# Patient Record
Sex: MALE | Race: WHITE | NOT HISPANIC OR LATINO | URBAN - METROPOLITAN AREA
[De-identification: names, ages, dates, MRNs, and addresses within clinical notes are randomized per-mention and may not be internally consistent; named-entity substitution may affect disease eponyms.]

---
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
## 2022-12-08 ENCOUNTER — APPOINTMENT (OUTPATIENT)
Dept: GENERAL RADIOLOGY | Facility: HOSPITAL | Age: 22
End: 2022-12-08

## 2022-12-08 ENCOUNTER — HOSPITAL ENCOUNTER (EMERGENCY)
Facility: HOSPITAL | Age: 22
Discharge: HOME | End: 2022-12-09
Attending: EMERGENCY MEDICINE | Admitting: EMERGENCY MEDICINE

## 2022-12-08 DIAGNOSIS — R07.89 CHEST WALL PAIN: Primary | ICD-10-CM

## 2022-12-08 PROCEDURE — 36415 COLL VENOUS BLD VENIPUNCTURE: CPT

## 2022-12-08 PROCEDURE — 99284 EMERGENCY DEPT VISIT MOD MDM: CPT

## 2022-12-08 PROCEDURE — 71046 X-RAY EXAM CHEST 2 VIEWS: CPT

## 2022-12-08 PROCEDURE — 93005 ELECTROCARDIOGRAM TRACING: CPT | Performed by: NURSE PRACTITIONER

## 2022-12-09 VITALS
RESPIRATION RATE: 18 BRPM | OXYGEN SATURATION: 95 % | SYSTOLIC BLOOD PRESSURE: 111 MMHG | TEMPERATURE: 98.4 F | HEIGHT: 66 IN | DIASTOLIC BLOOD PRESSURE: 64 MMHG | HEART RATE: 78 BPM | WEIGHT: 128.53 LBS | BODY MASS INDEX: 20.66 KG/M2

## 2022-12-09 LAB
ALBUMIN SERPL-MCNC: 5.3 G/DL (ref 3.5–5.2)
ALBUMIN/GLOB SERPL: 1.8 G/DL
ALP SERPL-CCNC: 56 U/L (ref 39–117)
ALT SERPL W P-5'-P-CCNC: 23 U/L (ref 1–41)
AMPHET+METHAMPHET UR QL: NEGATIVE
ANION GAP SERPL CALCULATED.3IONS-SCNC: 14 MMOL/L (ref 5–15)
AST SERPL-CCNC: 21 U/L (ref 1–40)
BARBITURATES UR QL SCN: NEGATIVE
BASOPHILS # BLD MANUAL: 0.14 10*3/MM3 (ref 0–0.2)
BASOPHILS NFR BLD MANUAL: 1 % (ref 0–1.5)
BENZODIAZ UR QL SCN: NEGATIVE
BILIRUB SERPL-MCNC: 0.6 MG/DL (ref 0–1.2)
BILIRUB UR QL STRIP: NEGATIVE
BUN SERPL-MCNC: 16 MG/DL (ref 6–20)
BUN/CREAT SERPL: 15 (ref 7–25)
CALCIUM SPEC-SCNC: 9.6 MG/DL (ref 8.6–10.5)
CANNABINOIDS SERPL QL: NEGATIVE
CHLORIDE SERPL-SCNC: 98 MMOL/L (ref 98–107)
CLARITY UR: CLEAR
CO2 SERPL-SCNC: 25 MMOL/L (ref 22–29)
COCAINE UR QL: NEGATIVE
COLOR UR: YELLOW
CREAT SERPL-MCNC: 1.07 MG/DL (ref 0.76–1.27)
DEPRECATED RDW RBC AUTO: 36.3 FL (ref 37–54)
EGFRCR SERPLBLD CKD-EPI 2021: 101.9 ML/MIN/1.73
EOSINOPHIL # BLD MANUAL: 0.14 10*3/MM3 (ref 0–0.4)
EOSINOPHIL NFR BLD MANUAL: 1 % (ref 0.3–6.2)
ERYTHROCYTE [DISTWIDTH] IN BLOOD BY AUTOMATED COUNT: 12.7 % (ref 12.3–15.4)
GLOBULIN UR ELPH-MCNC: 2.9 GM/DL
GLUCOSE SERPL-MCNC: 96 MG/DL (ref 65–99)
GLUCOSE UR STRIP-MCNC: NEGATIVE MG/DL
HCT VFR BLD AUTO: 50.6 % (ref 37.5–51)
HGB BLD-MCNC: 17.3 G/DL (ref 13–17.7)
HGB UR QL STRIP.AUTO: NEGATIVE
HOLD SPECIMEN: NORMAL
HOLD SPECIMEN: NORMAL
KETONES UR QL STRIP: NEGATIVE
LARGE PLATELETS: ABNORMAL
LEUKOCYTE ESTERASE UR QL STRIP.AUTO: NEGATIVE
LIPASE SERPL-CCNC: 23 U/L (ref 13–60)
LYMPHOCYTES # BLD MANUAL: 5.68 10*3/MM3 (ref 0.7–3.1)
LYMPHOCYTES NFR BLD MANUAL: 10 % (ref 5–12)
MCH RBC QN AUTO: 27.8 PG (ref 26.6–33)
MCHC RBC AUTO-ENTMCNC: 34.2 G/DL (ref 31.5–35.7)
MCV RBC AUTO: 81.3 FL (ref 79–97)
METHADONE UR QL SCN: NEGATIVE
MONOCYTES # BLD: 1.42 10*3/MM3 (ref 0.1–0.9)
NEUTROPHILS # BLD AUTO: 6.82 10*3/MM3 (ref 1.7–7)
NEUTROPHILS NFR BLD MANUAL: 47 % (ref 42.7–76)
NEUTS BAND NFR BLD MANUAL: 1 % (ref 0–5)
NITRITE UR QL STRIP: NEGATIVE
OPIATES UR QL: NEGATIVE
OXYCODONE UR QL SCN: NEGATIVE
PH UR STRIP.AUTO: 6.5 [PH] (ref 5–8)
PLATELET # BLD AUTO: 432 10*3/MM3 (ref 140–450)
PMV BLD AUTO: 9.7 FL (ref 6–12)
POTASSIUM SERPL-SCNC: 3.5 MMOL/L (ref 3.5–5.2)
PROT SERPL-MCNC: 8.2 G/DL (ref 6–8.5)
PROT UR QL STRIP: NEGATIVE
RBC # BLD AUTO: 6.22 10*6/MM3 (ref 4.14–5.8)
RBC MORPH BLD: NORMAL
SCAN SLIDE: NORMAL
SODIUM SERPL-SCNC: 137 MMOL/L (ref 136–145)
SP GR UR STRIP: 1.02 (ref 1–1.03)
TROPONIN T SERPL-MCNC: <0.01 NG/ML (ref 0–0.03)
TSH SERPL DL<=0.05 MIU/L-ACNC: 5.33 UIU/ML (ref 0.27–4.2)
UROBILINOGEN UR QL STRIP: NORMAL
VARIANT LYMPHS NFR BLD MANUAL: 40 % (ref 19.6–45.3)
WBC MORPH BLD: NORMAL
WBC NRBC COR # BLD: 14.2 10*3/MM3 (ref 3.4–10.8)
WHOLE BLOOD HOLD COAG: NORMAL
WHOLE BLOOD HOLD SPECIMEN: NORMAL

## 2022-12-09 PROCEDURE — 80307 DRUG TEST PRSMV CHEM ANLYZR: CPT | Performed by: NURSE PRACTITIONER

## 2022-12-09 PROCEDURE — 81003 URINALYSIS AUTO W/O SCOPE: CPT | Performed by: NURSE PRACTITIONER

## 2022-12-09 PROCEDURE — 85007 BL SMEAR W/DIFF WBC COUNT: CPT | Performed by: NURSE PRACTITIONER

## 2022-12-09 PROCEDURE — 83690 ASSAY OF LIPASE: CPT | Performed by: NURSE PRACTITIONER

## 2022-12-09 PROCEDURE — 84484 ASSAY OF TROPONIN QUANT: CPT | Performed by: NURSE PRACTITIONER

## 2022-12-09 PROCEDURE — 80050 GENERAL HEALTH PANEL: CPT | Performed by: NURSE PRACTITIONER

## 2022-12-09 RX ADMIN — LIDOCAINE HYDROCHLORIDE: 20 SOLUTION ORAL; TOPICAL at 00:13

## 2022-12-09 NOTE — DISCHARGE INSTRUCTIONS
Tylenol Motrin as needed.  Follow-up with your family doctor.  Return for any new or worsening symptoms

## 2022-12-09 NOTE — ED PROVIDER NOTES
"Subjective   History of Present Illness  Chief complaint: Multiple complaints      Context: Patient is a 22-year-old male who presents ambulatory by private vehicle with his mother with multiple complaints.  He states over the last month he has had several episodes of sharp or stabbing he chest wall pain that is worse with moving the right arm.  Has had increased amount of stress lately and today's anniversary of his father's death.  Does sit a lot playing video games.  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  Has not been taking any medications at home no alleviate his symptoms    Location: chest  Duration: month  Timing: intermittent  Quality/Severity: sharp  Modifying factors: worse with rom  Associated symptoms: no nausea        Additional hx provided by:  fco    PCP: yesenia             Review of Systems   Constitutional: Negative for fever.   HENT: Negative.    Eyes: Negative for visual disturbance.   Respiratory: Negative.    Cardiovascular: Negative.    Gastrointestinal: Diarrhea: ULIAHPI.   Genitourinary: Negative.    Musculoskeletal: Positive for myalgias.   Skin: Negative for rash.   Allergic/Immunologic: Negative for immunocompromised state.   Neurological: Negative.    Hematological: Does not bruise/bleed easily.   Psychiatric/Behavioral: Negative for confusion.       Past Medical History:   Diagnosis Date   • Abdominal pain, left upper quadrant 05/10/2019   • Acne 01/29/2019   • Acute rhinitis 01/05/2016   • Adenitis 05/14/2018   • Allergic reaction 12/19/2013   • Back pain 05/10/2019   • Cold sore 06/24/2014   • Constipation 08/04/2015   • Contact dermatitis 06/07/2017   • Dermatitis 06/19/2017   • Dysphagia 01/29/2019   • Epigastric pain 11/01/2012   • Fatigue 03/19/2018   • GERD (gastroesophageal reflux disease) " 01/29/2019   • Headache 04/10/2014   • Hereditary spherocytosis (CMS/HCC) 05/11/2018   • Icterus 03/19/2018   • Infection 06/07/2017    SKIN AND SOFT TISSUE   • Ingrown nail 06/03/2014   • Leg pain, left 05/10/2019   • Leg pain, right 05/10/2019   • LOM (left otitis media) 12/05/2011   • 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   • Seborrheic dermatitis 05/14/2018       Allergies   Allergen Reactions   • Bee Venom Swelling   • Latex Hives   • Tetanus-Diphth-Acell Pertussis Rash   • Tetanus Immune Globulin Swelling   • Pyridoxine Rash     B-6       Past Surgical History:   Procedure Laterality Date   • SPLENECTOMY      total       Family History   Problem Relation Age of Onset   • Other Other         SPHEROCYTOSIS   • Lupus Father        Social History     Socioeconomic History   • Marital status: Single   Tobacco Use   • Smoking status: Never   • Smokeless tobacco: Never           Objective   Physical Exam    Vital signs and triage nurse note reviewed.   Constitutional: Awake, alert; well-developed and well-nourished. No acute distress is noted. Nontoxic in appearance.  Mother at bedside.  HEENT: Normocephalic, atraumatic; pupils  with intact EOM; oropharynx is pink and moist without exudate or erythema.   Neck: Supple, full range of motion without pain;    Cardiovascular: Regular rate and rhythm, normal S1-S2.   Pulmonary: Respiratory effort regular nonlabored, breath sounds clear to auscultation all fields.   Abdomen: Soft, nontender nondistended with normoactive bowel sounds; no rebound or guarding.   Musculoskeletal: Independent range of motion of all extremities with no palpable tenderness or edema.   Neuro: Alert oriented x3, speech is clear and appropriate, GCS 15   Skin:  Fleshtone warm, dry, intact; no erythematous or petechial rash or lesion            Procedures      EKG viewed by me and interpreted by Dr. Cordero, sinus rhythm rate of 87 normal  comparison: None              ED Course      Labs Reviewed    COMPREHENSIVE METABOLIC PANEL - Abnormal; Notable for the following components:       Result Value    Albumin 5.30 (*)     All other components within normal limits    Narrative:     GFR Normal >60  Chronic Kidney Disease <60  Kidney Failure <15     TSH - Abnormal; Notable for the following components:    TSH 5.330 (*)     All other components within normal limits   CBC WITH AUTO DIFFERENTIAL - Abnormal; Notable for the following components:    WBC 14.20 (*)     RBC 6.22 (*)     RDW-SD 36.3 (*)     All other components within normal limits   MANUAL DIFFERENTIAL - Abnormal; Notable for the following components:    Lymphocytes Absolute 5.68 (*)     Monocytes Absolute 1.42 (*)     All other components within normal limits   LIPASE - Normal   URINALYSIS W/ MICROSCOPIC IF INDICATED (NO CULTURE) - Normal    Narrative:     Urine microscopic not indicated.   TROPONIN (IN-HOUSE) - Normal    Narrative:     Troponin T Reference Range:  <= 0.03 ng/mL-   Negative for AMI  >0.03 ng/mL-     Abnormal for myocardial necrosis.  Clinicians would have to utilize clinical acumen, EKG, Troponin and serial changes to determine if it is an Acute Myocardial Infarction or myocardial injury due to an underlying chronic condition.       Results may be falsely decreased if patient taking Biotin.     URINE DRUG SCREEN - Normal    Narrative:     Negative Thresholds Per Drugs Screened:    Amphetamines                 500 ng/ml  Barbiturates                 200 ng/ml  Benzodiazepines              100 ng/ml  Cocaine                      300 ng/ml  Methadone                    300 ng/ml  Opiates                      300 ng/ml  Oxycodone                    100 ng/ml  THC                           50 ng/ml    The Normal Value for all drugs tested is negative. This report includes final unconfirmed screening results to be used for medical treatment purposes only. Unconfirmed results must not be used for non-medical purposes such as employment or legal  testing. Clinical consideration should be applied to any drug of abuse test, particularly when unconfirmed results are used.          All urine drugs of abuse requests without chain of custody are for medical screening purposes only.  False positives are possible.     RAINBOW DRAW    Narrative:     The following orders were created for panel order [place] Draw.  Procedure                               Abnormality         Status                     ---------                               -----------         ------                     Green Top (Gel)[[id number]]                                  Final result               Lavender Top[[id number]]                                     Final result               Gold Top - SST[[id number]]                                   Final result               Light Blue Top[[id number]]                                   Final result                 Please view results for these tests on the individual orders.   SCAN SLIDE   CBC AND DIFFERENTIAL    Narrative:     The following orders were created for panel order CBC & Differential.  Procedure                               Abnormality         Status                     ---------                               -----------         ------                     CBC Auto Differential[[id number]]        Abnormal            Final result               Scan Slide[[id number]]                                       Final result                 Please view results for these tests on the individual orders.   GREEN TOP   LAVENDER TOP   GOLD TOP - SST   LIGHT BLUE TOP     Medications   GI cocktail ( Oral Given 12/9/22 0013)     No radiology results for the last day  Prior to Admission medications    Not on File                                          MDM  Number of Diagnoses or Management Options  Chest wall pain  Diagnosis management comments: 7/2021: PCP note: polycythemia; referral to rheum  2/2022: rheumatology note: hereditary spherocytosis s/p  "splenectomy in 2018 with esophageal dilations x 2  wbc 13.8-mother reported long standing issue; normal esr/crp, neg rf/ccp  father  from SLE/sjogren    /81   Pulse 82   Temp 98.3 °F (36.8 °C) (Oral)   Resp 18   Ht 167.6 cm (66\")   Wt 58.3 kg (128 lb 8.5 oz)   SpO2 99%   BMI 20.74 kg/m²       Comorbidities:  has a past medical history of Abdominal pain, left upper quadrant (05/10/2019), Acne (2019), Acute rhinitis (2016), Adenitis (2018), Allergic reaction (2013), Back pain (05/10/2019), Cold sore (2014), Constipation (2015), Contact dermatitis (2017), Dermatitis (2017), Dysphagia (2019), Epigastric pain (2012), Fatigue (2018), GERD (gastroesophageal reflux disease) (2019), Headache (04/10/2014), Hereditary spherocytosis (CMS/HCC) (2018), Icterus (2018), Infection (2017), Ingrown nail (2014), Leg pain, left (05/10/2019), Leg pain, right (05/10/2019), LOM (left otitis media) (2011), Mesenteric lymphadenitis (2014), and Seborrheic dermatitis (2018).  Differentials:   not all inclusive of differentials considered  Radiology interpretation:  X-rays reviewed by me and interpreted by dr mays, neg  Lab interpretation:  Labs viewed by me significant for, white blood cell count 14 which is reportedly normal.  Hemoglobin 7.3, hematocrit 50.6    Appropriate PPE worn during exam.  Patient had labs EKG and chest x-ray obtained  On reexam he is refusing any analgesic medications prescriptions.  We discussed importance of follow-up.  They verbalized understanding.  Low heart score, perc neg    i discussed findings with patient who voices understanding of discharge instructions, signs and symptoms requiring return to ED; discharged improved and in stable condition with follow up for re-evaluation.  This document is intended for medical expert use only. Reading of this document by patients and/or " patient's family without participating medical staff guidance may result in misinterpretation and unintended morbidity.  Any interpretation of such data is the responsibility of the patient and/or family member responsible for the patient in concert with their primary or specialist providers, not to be left for sources of online searches such as [employer], [employer] or similar queries. Relying on these approaches to knowledge may result in misinterpretation, misguided goals of care and even death should patients or family members try recommendations outside of the realm of professional medical care in a supervised inpatient environment.          Amount and/or Complexity of Data Reviewed  Independent visualization of images, tracings, or specimens: yes        Final diagnoses:   Chest wall pain       ED Disposition  ED Disposition     ED Disposition   Discharge    Condition   Stable    Comment   --             Yarely Beckman MD  [address] [facility]   [facility] [address]  [place] [id number] [phone]    Schedule an appointment as soon as possible for a visit            Medication List      No changes were made to your prescriptions during this visit.          Tiffanie Hairston, APRN  12/09/22 0142

## 2022-12-10 LAB — QT INTERVAL: 347 MS

## 2022-12-14 ENCOUNTER — OFFICE VISIT (OUTPATIENT)
Dept: FAMILY MEDICINE CLINIC | Facility: CLINIC | Age: 22
End: 2022-12-14
Payer: COMMERCIAL

## 2022-12-14 ENCOUNTER — HOSPITAL ENCOUNTER (OUTPATIENT)
Dept: GENERAL RADIOLOGY | Facility: HOSPITAL | Age: 22
Discharge: HOME | End: 2022-12-14

## 2022-12-14 VITALS
BODY MASS INDEX: 20.48 KG/M2 | DIASTOLIC BLOOD PRESSURE: 80 MMHG | RESPIRATION RATE: 16 BRPM | WEIGHT: 127.4 LBS | OXYGEN SATURATION: 99 % | SYSTOLIC BLOOD PRESSURE: 119 MMHG | HEIGHT: 66 IN | TEMPERATURE: 97.8 F | HEART RATE: 83 BPM

## 2022-12-14 DIAGNOSIS — M25.50 ARTHRALGIA, UNSPECIFIED JOINT: ICD-10-CM

## 2022-12-14 DIAGNOSIS — M79.7 FIBROMYALGIA: ICD-10-CM

## 2022-12-14 DIAGNOSIS — M79.606 LEG PAIN: ICD-10-CM

## 2022-12-14 DIAGNOSIS — M89.9 LESION OF FEMUR: ICD-10-CM

## 2022-12-14 DIAGNOSIS — M25.50 ARTHRALGIA, UNSPECIFIED JOINT: Primary | ICD-10-CM

## 2022-12-14 PROCEDURE — 99213 OFFICE O/P EST LOW 20 MIN: CPT | Performed by: INTERNAL MEDICINE

## 2022-12-14 PROCEDURE — 73551 X-RAY EXAM OF FEMUR 1: CPT

## 2022-12-14 PROCEDURE — 73552 X-RAY EXAM OF FEMUR 2/>: CPT

## 2022-12-14 PROCEDURE — 71120 X-RAY EXAM BREASTBONE 2/>VWS: CPT

## 2022-12-14 RX ORDER — DULOXETIN HYDROCHLORIDE 30 MG/1
30 CAPSULE, DELAYED RELEASE ORAL DAILY
Qty: 30 CAPSULE | Refills: 3 | Status: SHIPPED | OUTPATIENT
Start: 2022-12-14

## 2022-12-14 NOTE — PROGRESS NOTES
Sternum looks normal- no resolved fracture. Right femur normal- but left with cortical defect- would recheck in 1 year to see if changing, but no need for surgery at this time Encounter Date:04/11/2022      Patient ID: Ren Jacob is a 59 y.o. male.    Chief Complaint:  Status post CABG  Status post stent  Ischemic cardiomyopathy  Status post ICD      History of Present Illness  Patient recently had repositioning of the ICD and pain has improved significantly for to 3 weeks however he started having pain over the superior portion of the ICD and bothering him.  Patient recently had intervention to RCA and patient is not having any angina.    Since I have last seen, the patient has been without any chest discomfort ,shortness of breath, palpitations, dizziness or syncope.  Denies having any headache ,abdominal pain ,nausea, vomiting , diarrhea constipation, loss of weight or loss of appetite.  Denies having any excessive bruising ,hematuria or blood in the stool.    Review of all systems negative except as indicated.    Reviewed ROS.  Assessment and Plan       [[[[[[[[[[[[[[[[[[[[[[[  Impression  =============  -Chest pain-suggestive of possible unstable angina pectoris.  Troponin levels are negative.  EKG showed no acute changes.     -Status post CABG 2004.      -Status post stent placement to right coronary artery in the past.  -Status post stent to circumflex coronary artery and proximal and mid RCA 03/03/2017.  -Status post stent to RCA for in-stent restenosis 3/12/2020  -Status post stent to LAD 5/29/2020  -Status post emergency intervention to totally occluded LAD 6/8/2020 (anterior STEMI)  -Status post stent to RCA November 4, 2021  -Status post stent to RCA 3/29/2022.  (Impella)   IFR to circumflex coronary artery is normal.     -Status post acute anterior STEMI 6/8/2020  Status post emergency intervention for totally occluded left anterior descending artery 6/8/2020 (transient Impella support)  Patient apparently stopped taking Brilinta at the advice of gastroenterologist prior to STEMI presentation.     Cardiac catheterization 3/25/2022 revealed  Left ventricular  enlargement with severe and diffuse hypocontractility with ejection fraction of 20%.  No mitral regurgitation is present.  Left main coronary artery is normal.  Left anterior descending artery is normal.  Circumflex coronary artery proximally has 70 to 80% disease.  Right coronary artery is a large and dominant vessel that has multiple stents.  Mid segment of the right coronary artery has 95% disease.     Cardiac catheterization 11/3/2021   Left ventricle is enlarged with diffuse hypocontractility with ejection fraction of 20%.  No mitral regurgitation is present.  Left main coronary artery is normal.  Left anterior descending artery has mid to distal segment 50 to 60% disease.  Circumflex coronary artery has proximal 50% disease.  Right coronary artery has extensive stents and mid segment has 80% disease.  Patient had previously totally occluded SVG to RCA     Cardiac catheterization 3/12/2021 revealed  Left ventricle is significantly enlarged with diffuse hypocontractility with ejection fraction of 20%.  No mitral regurgitation is present.  Left main coronary artery normal.  Left anterior descending artery has diffuse luminal irregularities without any significant obstructive disease.  Circumflex coronary artery has proximal 50% disease.  Right coronary artery is a large and dominant vessel that has a lengthy area of stent.  Luminal irregularities are present without any significant obstructive disease.  SVG to RCA is chronically occluded.     Cardiac catheterization 11/12/2020 revealed  Left ventricle is significantly enlarged with severe and diffuse hypocontractility with ejection fraction of 20 to 25%.  Left main coronary artery normal.  Left anterior descending artery stent is patent.  Circumflex coronary artery has proximal 50% disease.  Right coronary artery is a dominant vessel that has lengthy stented area and no significant obstructive disease is present.  SVG to RCA totally occluded (chronic)     Repeat  cardiac catheterization 6/11/2020 revealed widely patent LAD stent.  Circumflex coronary artery has proximal 60% disease.  RCA has a lengthy area of stent with distal 60% disease.     -Cardiogenic shock with acute anterior STEMI 6/30/2020- improved     -Right bundle branch block in the presence of acute anterior STEMI.  Better now.     Troponin levels-peak of 12.  Today 10.     Cardiac catheterization 9/9/2020  Left ventricular dysfunction with ejection fraction of 20 to 25% consistent with ischemic cardiomyopathy.  Left main coronary artery is normal.  Left anterior descending artery stent is patent.  Circumflex coronary artery has proximal 60 to 70% disease (patient to have IFR)  Right coronary artery is a dominant vessel that has multiple stents.  Diffuse 40 to 50% luminal irregularities is present.  Please note the proximal stent is sticking into the aorta and makes it difficult to obtain right coronary artery injections.      - Status post dual-chamber ICD ([employer]) 6/15/2020.  Interrogation of the ICD revealed excellent pacing parameters.  Repositioning of the ICD generator (Dr. 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     -Allergy to morphine and penicillin     -Status post appendectomy and knee surgery.   ===========  Plan  ===========      Status post CABG  Status post stent RCA 11/4/2021 last stent)    Ischemic cardiomyopathy-stable.     Patient had stent to RCA 3/29/2022.  (Impella support)  IFR to circumflex coronary artery was normal.     Status post dual-chamber ICD  Recent repositioning of the ICD pulse generator.  Patient was concerned about functioning of the ICD.  Interrogation of the ICD was performed 3/26/2022 and showed normal function.  ICD site looks normal.  However, patient is having pain over the superior portion of the ICD again after 2 weeks of complete improvement.  We will have electrophysiologist  review and discuss about the options.     History of congestive heart failure-compensated at this time.  Patient is considering brain heart modulation therapy through [facility].      Medications were reviewed and updated.     Follow-up in the office in 6 weeks.  Further plan depends on patient's progress.  [[[[[[[[[[[[[[[[[[[[[            Diagnosis Plan   1. Hx of CABG     2. Presence of automatic cardioverter/defibrillator (AICD)     3. Status post angioplasty with stent     4. Ischemic cardiomyopathy     LAB RESULTS (LAST 7 DAYS)    CBC        BMP        CMP         BNP        TROPONIN        CoAg        Creatinine Clearance  Estimated Creatinine Clearance: 115.3 mL/min (by C-G formula based on SCr of 0.88 mg/dL).    ABG        Radiology  No radiology results for the last day                The following portions of the patient's history were reviewed and updated as appropriate: allergies, current medications, past family history, past medical history, past social history, past surgical history and problem list.    Review of Systems   Constitutional: Negative for malaise/fatigue.   Cardiovascular: Negative for chest pain, leg swelling, palpitations and syncope.   Respiratory: Negative for shortness of breath.    Skin: Negative for rash.   Gastrointestinal: Negative for nausea and vomiting.   Neurological: Negative for dizziness, light-headedness and numbness.   All other systems reviewed and are negative.        Current Outpatient Medications:   •  albuterol sulfate  (90 Base) MCG/ACT inhaler, Inhale 2 puffs Every 4 (Four) Hours As Needed for Wheezing., Disp: 8 g, Rfl: 0  •  amitriptyline (ELAVIL) 50 MG tablet, Take 1.5 tablets by mouth Every Night., Disp: 30 tablet, Rfl: 0  •  aspirin 81 MG EC tablet, Take 1 tablet by mouth Daily., Disp: 30 tablet, Rfl: 0  •  atorvastatin (LIPITOR) 80 MG tablet, Take 1 tablet by mouth every night at bedtime., Disp: 30 tablet, Rfl: 0  •  bisacodyl (DULCOLAX)  5 MG EC tablet, Take 5 mg by mouth Daily As Needed for Constipation., Disp: , Rfl:   •  busPIRone (BUSPAR) 10 MG tablet, Take 2 tablets by mouth 2 (Two) Times a Day., Disp: 60 tablet, Rfl: 0  •  busPIRone (BUSPAR) 10 MG tablet, Take 1 tablet by mouth Daily. noon, Disp: 30 tablet, Rfl: 0  •  clonazePAM (KlonoPIN) 0.5 MG tablet, Take 1 tablet by mouth At Night As Needed for Anxiety., Disp: 5 tablet, Rfl: 0  •  colestipol (COLESTID) 1 g tablet, Take 2 tablets by mouth 2 (Two) Times a Day., Disp: 30 tablet, Rfl: 0  •  Diclofenac Sodium (VOLTAREN) 1 % gel gel, Apply 4 g topically to the appropriate area as directed 2 (Two) Times a Day., Disp: , Rfl:   •  docusate sodium (COLACE) 100 MG capsule, Take 100 mg by mouth 2 (Two) Times a Day As Needed for Constipation., Disp: , Rfl:   •  escitalopram (LEXAPRO) 20 MG tablet, Take 1 tablet by mouth Daily., Disp: 30 tablet, Rfl: 0  •  fluticasone-salmeterol (ADVAIR) 250-50 MCG/DOSE DISKUS, Inhale 1 puff 2 (Two) Times a Day., Disp: 60 each, Rfl: 0  •  furosemide (LASIX) 80 MG tablet, Take 1 tablet by mouth 3 (Three) Times a Day., Disp: 90 tablet, Rfl: 0  •  gabapentin (NEURONTIN) 600 MG tablet, Take 2 tablets by mouth 3 (Three) Times a Day., Disp: 30 tablet, Rfl: 0  •  Galcanezumab-gnlm 100 MG/ML solution prefilled syringe, Inject 300 mg under the skin into the appropriate area as directed Every 30 (Thirty) Days. At onset of cluster period and then once monthly until end of cluster period, Disp: , Rfl:   •  ipratropium-albuterol (DUO-NEB) 0.5-2.5 mg/3 ml nebulizer, Take 3 mL by nebulization Every 4 (Four) Hours As Needed for Wheezing., Disp: 360 mL, Rfl: 0  •  isosorbide mononitrate (IMDUR) 30 MG 24 hr tablet, Take 1 tablet by mouth Daily for 30 days., Disp: 30 tablet, Rfl: 0  •  lisinopril (PRINIVIL,ZESTRIL) 10 MG tablet, Take 0.5 tablets by mouth Daily., Disp: 30 tablet, Rfl: 0  •  Melatonin 3 MG capsule, Take 1 capsule by mouth every night at bedtime., Disp: 10 capsule, Rfl:  0  •  metoprolol tartrate (LOPRESSOR) 25 MG tablet, Take 0.5 tablets by mouth 2 (Two) Times a Day., Disp: 30 tablet, Rfl: 0  •  Multiple Vitamins-Minerals (MULTIVITAMIN ADULTS) tablet, Take 1 tablet by mouth Daily., Disp: , Rfl:   •  nitroglycerin (NITROSTAT) 0.4 MG SL tablet, Place 1 tablet under the tongue Every 5 (Five) Minutes As Needed for Chest Pain (Only if SBP Greater Than 100). Take no more than 3 doses in 15 minutes., Disp: 30 tablet, Rfl: 0  •  pantoprazole (PROTONIX) 40 MG EC tablet, Take 1 tablet by mouth 2 (Two) Times a Day., Disp: 60 tablet, Rfl: 0  •  QUEtiapine (SEROquel) 300 MG tablet, Take 1 tablet by mouth Every Night., Disp: 30 tablet, Rfl: 0  •  ranolazine (RANEXA) 1000 MG 12 hr tablet, Take 1 tablet by mouth Every 12 (Twelve) Hours., Disp: 60 tablet, Rfl: 0  •  ticagrelor (Brilinta) 90 MG tablet tablet, Take 1 tablet by mouth 2 (Two) Times a Day. Pt is seeing Dr. Rangel tomorrow and will mention to Brilinta to see if he should stop it-- Dr. Cervantes told him to not stop it and he thinks Dr. rangel is aware, but he is going to ask tomorrow, Disp: 60 tablet, Rfl: 0  •  tiotropium bromide monohydrate (Spiriva Respimat) 2.5 MCG/ACT aerosol solution inhaler, Inhale 2 puffs Daily., Disp: 1 each, Rfl: 0    Allergies   Allergen Reactions   • Ketorolac Tromethamine Other (See Comments)   • Ondansetron Nausea And Vomiting   • Penicillins Swelling     throat   • Morphine Rash       Family History   Problem Relation Age of Onset   • Cancer Mother    • Heart disease Father    • Heart disease Sister        Past Surgical History:   Procedure Laterality Date   • APPENDECTOMY     • BIVENTRICULAR ASSIST DEVICE/LEFT VENTRICULAR ASSIST DEVICE INSERTION N/A 6/8/2020    Procedure: Left Ventricular Assist Device;  Surgeon: John Marino MD;  Location: [facility] INVASIVE LOCATION;  Service: Cardiology;  Laterality: N/A;   • BRONCHOSCOPY N/A 11/3/2021    Procedure: BRONCHOSCOPY;  Surgeon: Martir Stover MD;   Location: [facility] ENDOSCOPY;  Service: Pulmonary;  Laterality: N/A;  post: bronchitis, no blood noted in lung fields   • CARDIAC CATHETERIZATION N/A 3/12/2020    Procedure: Left Heart Cath and coronary angiogram;  Surgeon: Halie Cervantes MD;  Location: [facility] CATH INVASIVE LOCATION;  Service: Cardiovascular;  Laterality: N/A;   • CARDIAC CATHETERIZATION N/A 3/12/2020    Procedure: Left ventriculography;  Surgeon: Halie Cervantes MD;  Location: [facility] CATH INVASIVE LOCATION;  Service: Cardiovascular;  Laterality: N/A;   • CARDIAC CATHETERIZATION N/A 3/12/2020    Procedure: Stent LAURA coronary;  Surgeon: Ritchie Gaines MD;  Location: [facility] CATH INVASIVE LOCATION;  Service: Cardiovascular;  Laterality: N/A;   • CARDIAC CATHETERIZATION N/A 3/12/2020    Procedure: Left Heart Cath, possible pci;  Surgeon: Ritchie Gaines MD;  Location: [facility] CATH INVASIVE LOCATION;  Service: Cardiovascular;  Laterality: N/A;   • CARDIAC CATHETERIZATION N/A 6/8/2020    Procedure: Left Heart Cath;  Surgeon: John Marino MD;  Location: [facility] CATH INVASIVE LOCATION;  Service: Cardiology;  Laterality: N/A;   • CARDIAC CATHETERIZATION N/A 6/8/2020    Procedure: Stent LAURA coronary;  Surgeon: John Marino MD;  Location: [facility] CATH INVASIVE LOCATION;  Service: Cardiology;  Laterality: N/A;   • CARDIAC CATHETERIZATION N/A 6/8/2020    Procedure: Right Heart Cath;  Surgeon: John Marino MD;  Location: [facility] CATH INVASIVE LOCATION;  Service: Cardiology;  Laterality: N/A;   • CARDIAC CATHETERIZATION N/A 6/11/2020    Procedure: Left Heart Cath and coronary angiogram;  Surgeon: Halie Cervantes MD;  Location: [facility] CATH INVASIVE LOCATION;  Service: Cardiovascular;  Laterality: N/A;   • CARDIAC CATHETERIZATION N/A 6/15/2020    Procedure: Thoracic venogram;  Surgeon: Halie Cervantes MD;  Location: [facility] CATH INVASIVE LOCATION;  Service: Cardiovascular;  Laterality: N/A;   • CARDIAC  CATHETERIZATION Left 5/29/2020    Procedure: Left Heart Cath and coronary angiogram;  Surgeon: Halie Cervantes MD;  Location:  [facility] CATH INVASIVE LOCATION;  Service: Cardiovascular;  Laterality: Left;   • CARDIAC CATHETERIZATION N/A 5/29/2020    Procedure: Saphenous Vein Graft;  Surgeon: Halie Cervantes MD;  Location:  [facility] CATH INVASIVE LOCATION;  Service: Cardiovascular;  Laterality: N/A;   • CARDIAC CATHETERIZATION N/A 5/29/2020    Procedure: Left ventriculography;  Surgeon: Halie Cervantes MD;  Location:  [facility] CATH INVASIVE LOCATION;  Service: Cardiovascular;  Laterality: N/A;   • CARDIAC CATHETERIZATION  5/29/2020    Procedure: Functional Flow [place];  Surgeon: Lizz Boston MD;  Location: [facility] CATH INVASIVE LOCATION;  Service: Cardiovascular;;   • CARDIAC CATHETERIZATION N/A 5/29/2020    Procedure: Stent LAURA coronary;  Surgeon: Lizz Boston MD;  Location: [facility] CATH INVASIVE LOCATION;  Service: Cardiovascular;  Laterality: N/A;   • CARDIAC CATHETERIZATION Right 9/9/2020    Procedure: Left Heart Cath and coronary angiogram;  Surgeon: Halie Cervantes MD;  Location: [facility] CATH INVASIVE LOCATION;  Service: Cardiovascular;  Laterality: Right;   • CARDIAC CATHETERIZATION N/A 9/9/2020    Procedure: Saphenous Vein Graft;  Surgeon: Halie Cervantes MD;  Location: [facility] CATH INVASIVE LOCATION;  Service: Cardiovascular;  Laterality: N/A;   • CARDIAC CATHETERIZATION  9/9/2020    Procedure: Functional Flow [place];  Surgeon: Ritchie Gaines MD;  Location: [facility] CATH INVASIVE LOCATION;  Service: Cardiology;;   • CARDIAC CATHETERIZATION N/A 11/12/2020    Procedure: Left Heart Cath and coronary angiogram;  Surgeon: Halie Cervantes MD;  Location: [facility] CATH INVASIVE LOCATION;  Service: Cardiovascular;  Laterality: N/A;   • CARDIAC CATHETERIZATION N/A 11/12/2020    Procedure: Saphenous Vein Graft;  Surgeon: Halie Cervantes MD;  Location: [facility] CATH INVASIVE LOCATION;   Service: Cardiovascular;  Laterality: N/A;   • CARDIAC CATHETERIZATION N/A 11/12/2020    Procedure: Left ventriculography;  Surgeon: Halie Cervantes MD;  Location:  [facility] CATH INVASIVE LOCATION;  Service: Cardiovascular;  Laterality: N/A;   • CARDIAC CATHETERIZATION N/A 3/12/2021    Procedure: Left Heart Cath and coronary angiogram;  Surgeon: Halie Cervantes MD;  Location:  [facility] CATH INVASIVE LOCATION;  Service: Cardiovascular;  Laterality: N/A;   • CARDIAC CATHETERIZATION N/A 3/12/2021    Procedure: Saphenous Vein Graft;  Surgeon: Halie Cervantes MD;  Location:  [facility] CATH INVASIVE LOCATION;  Service: Cardiovascular;  Laterality: N/A;   • CARDIAC CATHETERIZATION N/A 11/3/2021    Procedure: Left Heart Cath and coronary angiogram;  Surgeon: Halie Cervantes MD;  Location:  [facility] CATH INVASIVE LOCATION;  Service: Cardiovascular;  Laterality: N/A;   • CARDIAC CATHETERIZATION N/A 11/4/2021    Procedure: Percutaneous Coronary Intervention, laser;  Surgeon: Ritchie Gaines MD;  Location:  [facility] CATH INVASIVE LOCATION;  Service: Cardiovascular;  Laterality: N/A;   • CARDIAC CATHETERIZATION N/A 11/4/2021    Procedure: Stent LAURA coronary;  Surgeon: Ritchie Gaines MD;  Location:  [facility] CATH INVASIVE LOCATION;  Service: Cardiovascular;  Laterality: N/A;   • CARDIAC CATHETERIZATION N/A 3/28/2022    Procedure: Percutaneous Coronary Intervention;  Surgeon: Ritchie Gaines MD;  Location:  [facility] CATH INVASIVE LOCATION;  Service: Cardiovascular;  Laterality: N/A;  Impella and laser   • CARDIAC CATHETERIZATION N/A 3/25/2022    Procedure: Left Heart Cath and coronary angiogram;  Surgeon: Halie Cervantes MD;  Location:  [facility] CATH INVASIVE LOCATION;  Service: Cardiovascular;  Laterality: N/A;   • CARDIAC ELECTROPHYSIOLOGY PROCEDURE N/A 6/15/2020    Procedure: IMPLANTABLE CARDIOVERTER DEFIBRILLATOR INSERTION-DC;  Surgeon: Halie Cervantes MD;  Location:  [facility] CATH INVASIVE LOCATION;  Service:  Cardiovascular;  Laterality: N/A;   • CARDIAC ELECTROPHYSIOLOGY PROCEDURE N/A 6/15/2020    Procedure: EP/CRM Study;  Surgeon: Brina Douglas MD;  Location: [facility] CATH INVASIVE LOCATION;  Service: Cardiology;  Laterality: N/A;   • CARDIAC ELECTROPHYSIOLOGY PROCEDURE N/A 3/1/2022    Procedure: ICD can repositioning [place] aware;  Surgeon: Sarah Milligan MD;  Location: [facility] CATH INVASIVE LOCATION;  Service: Cardiology;  Laterality: N/A;   • CORONARY ANGIOPLASTY      2 stents, last one placed 2018   • CORONARY ARTERY BYPASS GRAFT  2004   • INGUINAL HERNIA REPAIR Bilateral 10/29/2019    Procedure: BILATERAL INGUINAL HERNIA REPAIRS W/MESH;  Surgeon: Adriana Baker MD;  Location: [facility] MAIN OR;  Service: General   • INSERT / REPLACE / REMOVE PACEMAKER     • JOINT REPLACEMENT Left    • KNEE ARTHROPLASTY Left     x 5   • NISSEN FUNDOPLICATION LAPAROSCOPIC      x 2   • PACEMAKER IMPLANTATION     • SKIN CANCER EXCISION         Past Medical History:   Diagnosis Date   • Anxiety    • Asthma    • Bruises easily    • CHF (congestive heart failure) ([facility])    • Chronic respiratory failure with hypoxia ([facility]) 06/12/2020   • Constipation    • COPD (chronic obstructive pulmonary disease) ([facility])    • Coronary artery disease     Dr. Cervantes   • Depression    • Dysphagia 09/2020   • Dyspnea    • GERD (gastroesophageal reflux disease)    • Hyperlipidemia    • Hypertension    • Lesion of lung 06/2020    following up with dr. william   • Old myocardial infarction 2011    and 2 in June, 2020   • Pancreatitis    • Panic attack    • Simple chronic bronchitis ([facility]) 05/28/2020    Added automatically from request for surgery [id number]   • Sleep apnea     O2 QHS   • Stomach ulcer 2019       Family History   Problem Relation Age of Onset   • Cancer Mother    • Heart disease Father    • Heart disease Sister        Social History     Socioeconomic History   • Marital status:    Tobacco Use   • Smoking status: Former Smoker     Types:  "Cigarettes     Quit date:      Years since quittin.2   • Smokeless tobacco: Never Used   Vaping Use   • Vaping Use: Never used   Substance and Sexual Activity   • Alcohol use: Yes     Comment: 1 glass/2 months   • Sexual activity: Defer         Procedures      Objective:       Physical Exam    /79   Pulse 66   Ht 180.3 cm (71\")   Wt 88 kg (194 lb)   SpO2 99%   BMI 27.06 kg/m²   The patient is alert, oriented and in no distress.    Vital signs as noted above.    Head and neck revealed no carotid bruits or jugular venous distension.  No thyromegaly or lymphadenopathy is present.    Lungs clear.  No wheezing.  Breath sounds are normal bilaterally.    Heart normal first and second heart sounds.  No murmur..  No pericardial rub is present.  No gallop is present.    Abdomen soft and nontender.  No organomegaly is present.    Extremities revealed good peripheral pulses without any pedal edema.    Skin warm and dry.  ICD site looks normal although there is tenderness over the superior part of the ICD    Musculoskeletal system is grossly normal.    CNS grossly normal.    Reviewed and updated.        "

## 2022-12-14 NOTE — PROGRESS NOTES
Rooming Tab(CC,VS,Pt Hx,Fall Screen)  Chief Complaint   Patient presents with   • Hospital Follow Up Visit     Patient went to [facility] ER on 12/8/22 for chest wall pain but was not admitted.       Subjective      The patient is accompanied by an adult female.    Chest pain  The patient reports that he went to the emergency room because his chest felt like he was being stabbed. He states that he has been experiencing this sporadically for approximately 1 month. When the pain occurs, it lasts for the rest of the day. He notes that the pain is only on the right side. He states that the pain is reproducible. He denies getting sweaty or short of breath with it. The adult female notes that positioning would sometimes make the pain slightly better. 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 The patient denies ever waking up to the pain. He reports that he eats 1 meal a day at 6:00 PM to 7:00 PM. In the emergency room, blood work, EKG and a chest radiograph were performed. They ruled out any heart, lung or gallbladder issues. This pain occurs mostly at work. It has only happened once or twice when he was not at work. The adult female states that the rheumatologist thought he had a crack or fracture in the sternum. The patient reports that he moves his mouse a lot during the day, but that is more wrist than anything. He reports that he makes sure everything is ergonomically correct. He reports that he had a radiograph performed at [facility] on 02/16/2022. He states that he was also told that he may have fibromyalgia.    Right thigh mass  The patient reports that he was told that there was a mass in his right thigh.     I have reviewed and updated his medications, medical history and problem list during today's office visit.     Patient Care Team:  Yarely Beckman MD as PCP - General (Internal Medicine)    Problem  List Tab  Medications Tab  Synopsis Tab  Chart Review Tab  Care Everywhere Tab  Immunizations Tab  Patient History Tab    Social History     Tobacco Use   • Smoking status: Never   • Smokeless tobacco: Never   Substance Use Topics   • Alcohol use: Not on file       Review of Systems    Objective     Rooming Tab(CC,VS,Pt Hx,Fall Screen)  /80   Pulse 83   Temp 97.8 °F (36.6 °C)   Resp 16   Ht 167.6 cm (66\")   Wt 57.8 kg (127 lb 6.4 oz)   SpO2 99%   BMI 20.56 kg/m²     Body mass index is 20.56 kg/m².    Physical Exam  Vitals and nursing note reviewed.   Constitutional:       Appearance: Normal appearance. He is well-developed.   HENT:      Head: Normocephalic and atraumatic.      Nose: No rhinorrhea.   Eyes:      Pupils: Pupils are equal, round, and reactive to light.   Cardiovascular:      Rate and Rhythm: Normal rate and regular rhythm.      Pulses: Normal pulses.      Heart sounds: Normal heart sounds. No murmur heard.     Comments: Tender palpate sternum  Pulmonary:      Effort: Pulmonary effort is normal.      Breath sounds: Normal breath sounds.   Musculoskeletal:         General: No tenderness.      Cervical back: Normal range of motion and neck supple.   Skin:     Capillary Refill: Capillary refill takes less than 2 seconds.   Neurological:      Mental Status: He is alert and oriented to person, place, and time.   Psychiatric:         Mood and Affect: Mood normal.         Behavior: Behavior normal.          Statin Choice Calculator  Data Reviewed:    XR Chest 2 View    Result Date: 12/9/2022  Impression: No active cardiopulmonary disease  Electronically Signed By-Dwight Mishra On:12/9/2022 7:24 AM This report was finalized on [id number] by  Dwight Mishra, .    XR Sternum PA & Lateral    Result Date: 12/14/2022  Impression: No evidence for displaced fracture or malalignment of the sternum.  Electronically Signed By-Flavio Nagy MD On:12/14/2022 3:13 PM This report was finalized on  [id number] by  Flavio Nagy MD.    XR Femur 2 View Right (In Office)    Result Date: 12/14/2022  Impression: 1.     Negative radiographs of the right femur. If there is continued concern for a mass associated with the right thigh, correlation with MRI of the right thigh would be helpful for better characterization of the soft tissues.  Electronically Signed By-Flavio Nagy MD On:12/14/2022 3:41 PM This report was finalized on [id number] by  Flavio Nagy MD.    XR Femur 1 View Left    Result Date: 12/14/2022  Impression: IMPRESSION : Findings most consistent with a fibrous cortical defect at the medial aspect of the distal diaphysis of the left femur. Follow-up may be obtained to ensure stability as indicated.  Electronically Signed By-Flavio Nagy MD On:12/14/2022 3:43 PM This report was finalized on [id number] by  Flavio Nagy MD.      The data below has been reviewed by Yarely Beckman MD on 12/14/2022.      Lab Results   Component Value Date    BUN 16 12/09/2022    CREATININE 1.07 12/09/2022     12/09/2022    K 3.5 12/09/2022    CL 98 12/09/2022    CALCIUM 9.6 12/09/2022    ALBUMIN 5.30 (H) 12/09/2022    BILITOT 0.6 12/09/2022    ALKPHOS 56 12/09/2022    AST 21 12/09/2022    ALT 23 12/09/2022    WBC 14.20 (H) 12/09/2022    RBC 6.22 (H) 12/09/2022    HCT 50.6 12/09/2022    MCV 81.3 12/09/2022    MCH 27.8 12/09/2022    TSH 5.330 (H) 12/09/2022      Assessment & Plan   Order Review Tab  Health Maintenance Tab  Patient Plan/Order Tab  Diagnoses and all orders for this visit:    1. Arthralgia, unspecified joint (Primary)  -     XR Sternum PA & Lateral; Future  -     Cancel: XR Femur 2 View Right (In Office)  -     XR Femur 2 View Right (In Office)    2. Lesion of femur  -     XR Sternum PA & Lateral; Future  -     Cancel: XR Femur 2 View Right (In Office)  -     XR Femur 2 View Right (In Office)    3. Fibromyalgia    Other orders  -     DULoxetine (Cymbalta) 30 MG capsule; Take 1  capsule by mouth Daily.  Dispense: 30 capsule; Refill: 3    1. Chest pain  We will obtain a radiograph of the chest.    2. Fibromyalgia  We will start the patient on Cymbalta 30 mg daily. If he does not feel any difference in 6 weeks, we can go to 60 mg daily.  We discussed the importance of staying active, healthy, and eating plenty of fluids.  .   3. Right thigh mass  We will obtain a radiograph of the right thigh.      Wrapup Tab  Return if symptoms worsen or fail to improve.       They were informed of the diagnosis and treatment plan and directed to f/u for any further problems or concerns.      Transcribed from ambient dictation for Yarely Beckman MD by Cori Mcneil.  12/14/22   15:21 EST    Patient or patient representative verbalized consent to the visit recording.  I have personally performed the services described in this document as transcribed by the above individual, and it is both accurate and complete.  Yarely Beckman MD  1/1/2023  22:02 EST

## 2023-04-11 ENCOUNTER — LAB (OUTPATIENT)
Dept: FAMILY MEDICINE CLINIC | Facility: CLINIC | Age: 23
End: 2023-04-11
Payer: COMMERCIAL

## 2023-04-11 ENCOUNTER — OFFICE VISIT (OUTPATIENT)
Dept: FAMILY MEDICINE CLINIC | Facility: CLINIC | Age: 23
End: 2023-04-11
Payer: COMMERCIAL

## 2023-04-11 VITALS
SYSTOLIC BLOOD PRESSURE: 96 MMHG | HEIGHT: 66 IN | OXYGEN SATURATION: 98 % | HEART RATE: 65 BPM | BODY MASS INDEX: 20.57 KG/M2 | WEIGHT: 128 LBS | DIASTOLIC BLOOD PRESSURE: 64 MMHG

## 2023-04-11 DIAGNOSIS — M79.7 FIBROMYALGIA: ICD-10-CM

## 2023-04-11 DIAGNOSIS — D58.0 HEREDITARY SPHEROCYTOSIS: ICD-10-CM

## 2023-04-11 DIAGNOSIS — M25.50 ARTHRALGIA, UNSPECIFIED JOINT: Primary | ICD-10-CM

## 2023-04-11 LAB
ALBUMIN SERPL-MCNC: 5.2 G/DL (ref 3.5–5.2)
ALBUMIN/GLOB SERPL: 2.3 G/DL
ALP SERPL-CCNC: 53 U/L (ref 39–117)
ALT SERPL W P-5'-P-CCNC: 21 U/L (ref 1–41)
ANION GAP SERPL CALCULATED.3IONS-SCNC: 11.2 MMOL/L (ref 5–15)
AST SERPL-CCNC: 14 U/L (ref 1–40)
BASOPHILS # BLD AUTO: 0.1 10*3/MM3 (ref 0–0.2)
BASOPHILS NFR BLD AUTO: 0.9 % (ref 0–1.5)
BILIRUB CONJ SERPL-MCNC: 0.2 MG/DL (ref 0–0.3)
BILIRUB SERPL-MCNC: 0.6 MG/DL (ref 0–1.2)
BUN SERPL-MCNC: 17 MG/DL (ref 6–20)
BUN/CREAT SERPL: 13.8 (ref 7–25)
CALCIUM SPEC-SCNC: 9.7 MG/DL (ref 8.6–10.5)
CHLORIDE SERPL-SCNC: 101 MMOL/L (ref 98–107)
CO2 SERPL-SCNC: 26.8 MMOL/L (ref 22–29)
CREAT SERPL-MCNC: 1.23 MG/DL (ref 0.76–1.27)
DEPRECATED RDW RBC AUTO: 36.1 FL (ref 37–54)
EGFRCR SERPLBLD CKD-EPI 2021: 86.2 ML/MIN/1.73
EOSINOPHIL # BLD AUTO: 0.35 10*3/MM3 (ref 0–0.4)
EOSINOPHIL NFR BLD AUTO: 3.3 % (ref 0.3–6.2)
ERYTHROCYTE [DISTWIDTH] IN BLOOD BY AUTOMATED COUNT: 12.3 % (ref 12.3–15.4)
GLOBULIN UR ELPH-MCNC: 2.3 GM/DL
GLUCOSE SERPL-MCNC: 85 MG/DL (ref 65–99)
HCT VFR BLD AUTO: 49.1 % (ref 37.5–51)
HGB BLD-MCNC: 17.1 G/DL (ref 13–17.7)
IMM GRANULOCYTES # BLD AUTO: 0.02 10*3/MM3 (ref 0–0.05)
IMM GRANULOCYTES NFR BLD AUTO: 0.2 % (ref 0–0.5)
LYMPHOCYTES # BLD AUTO: 4.07 10*3/MM3 (ref 0.7–3.1)
LYMPHOCYTES NFR BLD AUTO: 38.1 % (ref 19.6–45.3)
MCH RBC QN AUTO: 28.5 PG (ref 26.6–33)
MCHC RBC AUTO-ENTMCNC: 34.8 G/DL (ref 31.5–35.7)
MCV RBC AUTO: 81.7 FL (ref 79–97)
MONOCYTES # BLD AUTO: 1.15 10*3/MM3 (ref 0.1–0.9)
MONOCYTES NFR BLD AUTO: 10.8 % (ref 5–12)
NEUTROPHILS NFR BLD AUTO: 4.98 10*3/MM3 (ref 1.7–7)
NEUTROPHILS NFR BLD AUTO: 46.7 % (ref 42.7–76)
NRBC BLD AUTO-RTO: 0 /100 WBC (ref 0–0.2)
PLATELET # BLD AUTO: 388 10*3/MM3 (ref 140–450)
PMV BLD AUTO: 12.9 FL (ref 6–12)
POTASSIUM SERPL-SCNC: 4.2 MMOL/L (ref 3.5–5.2)
PROT SERPL-MCNC: 7.5 G/DL (ref 6–8.5)
RBC # BLD AUTO: 6.01 10*6/MM3 (ref 4.14–5.8)
SODIUM SERPL-SCNC: 139 MMOL/L (ref 136–145)
WBC NRBC COR # BLD: 10.67 10*3/MM3 (ref 3.4–10.8)

## 2023-04-11 PROCEDURE — 85025 COMPLETE CBC W/AUTO DIFF WBC: CPT | Performed by: INTERNAL MEDICINE

## 2023-04-11 PROCEDURE — 82248 BILIRUBIN DIRECT: CPT | Performed by: INTERNAL MEDICINE

## 2023-04-11 PROCEDURE — 36415 COLL VENOUS BLD VENIPUNCTURE: CPT | Performed by: INTERNAL MEDICINE

## 2023-04-11 PROCEDURE — 80053 COMPREHEN METABOLIC PANEL: CPT | Performed by: INTERNAL MEDICINE

## 2023-04-11 RX ORDER — AMITRIPTYLINE HYDROCHLORIDE 25 MG/1
25 TABLET, FILM COATED ORAL NIGHTLY
Qty: 30 TABLET | Refills: 3 | Status: SHIPPED | OUTPATIENT
Start: 2023-04-11

## 2023-04-11 NOTE — PROGRESS NOTES
"Rooming Tab(CC,VS,Pt Hx,Fall Screen)  Chief Complaint   Patient presents with   • Abdominal Pain   • Generalized Body Aches     Has been going on for years but has gotten worse in the last couple months       Subjective      The patient is accompanied by his mother.    Chronic pain  The patient notes that his chest is still bothering him the most. 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 The patient experiences more knee pain when he is sitting. When the patient is home, he stands up at least once every hour to grab a snack or go to the bathroom; however, when he is at work, he has to stand up to take videos, as he is a social . The patient is not taking glucosamine or turmeric.    Mood  The patient states that the duloxetine helps his mood significantly; however, he experiences fear. Fear is one of the symptoms he read on the duloxetine bottle. The patient becomes scared of absolutely nothing all the time. The patient was terrified, so he stopped taking the duloxetine and the fear disappeared. The adult female mentions that the patient carries a gun with him, as the patient goes to different places for work. The patient notes that he absolutely does not want to be afraid of anything. The duloxetine helped with his mood swings. The patient has just been taking the duloxetine at the low dose of 30 mg. The patient notes that he is not sleeping well. The patient states that he is already tired every morning. The adult female notes that the patient does not usually smile until 10:00 AM or 11:00 AM.     Abdominal pain with jaundice  The adult female reports that they were on a cruise, and they just returned yesterday, 04/10/2023. Someone on the cruise had COVID-19. The adult female notes that no one else has any symptoms of COVID-19. When the patient was on the cruise, he felt an intense pain \"internally\" and not necessarily in the joints. The " patient experienced pain in his upper abdomen. On the last day of the cruise, everybody was concerned about the patient, as he looked awful. The adult female mentions that the patient's eyes were yellow. 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 One of the people on the cruise who saw the patient's condition actually told the patient to consult a clinician. The patient states that his upper abdomen is still currently painful. 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 The patient states that he is not eating differently. The adult female reports that the patient ate salmon and steak. The patient denies any constipation or diarrhea. The patient denies any change in bowels. The patient denies any blood in his stool. The patient had a HIDA scan and an ultrasound looking for gallbladder in 2018. In 2023, he just had a chest x-ray. The adult female inquires if the CAT scan will show the splenules. The patient inquires if the splenules can be the only reason for the patient's eyes being yellow in color. The patient did not take any medications for the abdominal pain during the cruise. The patient took a picture of his eyes yellowing; however, he deleted it right away. The patient has been working 40 hours every week, dancing 4 to 5 days weekly.      I have reviewed and updated his medications, medical history and problem list during today's office visit.     Patient Care Team:  Yarely Beckman MD as PCP - General (Internal Medicine)    Problem List Tab  Medications Tab  Synopsis Tab  Chart Review Tab  Care Everywhere Tab  Immunizations Tab  Patient History Tab    Social History     Tobacco Use   • Smoking status: Never   • Smokeless tobacco: Never   Substance Use Topics   • Alcohol use: Not on file       Review of Systems    Objective     Rooming Tab(CC,VS,Pt Hx,Fall Screen)  BP 96/64   Pulse 65   Ht 167.6 cm  "(66\")   Wt 58.1 kg (128 lb)   SpO2 98%   BMI 20.66 kg/m²     Body mass index is 20.66 kg/m².    Physical Exam  Vitals and nursing note reviewed.   Constitutional:       Appearance: Normal appearance. He is well-developed.   HENT:      Head: Normocephalic and atraumatic.      Nose: No rhinorrhea.   Eyes:      General: No scleral icterus.     Pupils: Pupils are equal, round, and reactive to light.   Cardiovascular:      Rate and Rhythm: Normal rate and regular rhythm.      Pulses: Normal pulses.      Heart sounds: Normal heart sounds. No murmur heard.  Pulmonary:      Effort: Pulmonary effort is normal.      Breath sounds: Normal breath sounds.   Abdominal:      General: Bowel sounds are normal. There is no distension.      Palpations: Abdomen is soft.      Comments: No hepatomegaly   Musculoskeletal:         General: No tenderness.      Cervical back: Normal range of motion and neck supple.   Skin:     Capillary Refill: Capillary refill takes less than 2 seconds.   Neurological:      Mental Status: He is alert and oriented to person, place, and time.   Psychiatric:         Mood and Affect: Mood normal.         Behavior: Behavior normal.          Statin Choice Calculator  Data Reviewed:         The data below has been reviewed by Yarely Beckman MD on 04/11/2023.          Assessment & Plan   Order Review Tab  Health Maintenance Tab  Patient Plan/Order Tab  Diagnoses and all orders for this visit:    1. Arthralgia, unspecified joint (Primary)  Comments:  trial elavil at night     2. Fibromyalgia    3. Hereditary spherocytosis  Comments:   has had spelnomegaly- but now with jaundice and increased pain again  Orders:  -     CBC w AUTO Differential  -     Comprehensive Metabolic Panel  -     CT Abdomen Pelvis With Contrast; Future    Other orders  -     amitriptyline (ELAVIL) 25 MG tablet; Take 1 tablet by mouth Every Night.  Dispense: 30 tablet; Refill: 3      Chronic pain  - The patient is advised to not sit " more than 60 minutes straight.  - The patient is advised to ambulate and move at least 5 minutes every hour.  - The patient is advised the possible use of amitriptyline (Elavil) for the chronic pain.  - The patient is advised that the amitriptyline (Elavil) can cause tiredness, so he has to take it at night.  - The patient is advised the use of glucosamine or turmeric.    Abdominal pain with jaundice  - The patient will be ordered blood tests today, 04/11/2023.  - The patient will be ordered a computerized tomography scan to look for small splenules, which is to be scheduled at [facility].  - The patient is advised that the yellowing of the eyes could be also from coming from hemolysis with the blood or liver, hepatitis A or hepatitis E that are from food, or drinking alcohol.      Wrapup Tab  Return if symptoms worsen or fail to improve.       They were informed of the diagnosis and treatment plan and directed to f/u for any further problems or concerns.      Transcribed from ambient dictation for Yarely Beckman MD by Greg Nath.  04/11/23   11:29 EDT    Patient or patient representative verbalized consent to the visit recording.  I have personally performed the services described in this document as transcribed by the above individual, and it is both accurate and complete.  Yarely Beckman MD  4/13/2023  08:16 EDT

## 2023-04-17 DIAGNOSIS — D58.0 HEREDITARY SPHEROCYTOSIS: ICD-10-CM

## 2023-05-05 ENCOUNTER — PATIENT MESSAGE (OUTPATIENT)
Dept: FAMILY MEDICINE CLINIC | Facility: CLINIC | Age: 23
End: 2023-05-05
Payer: COMMERCIAL

## 2023-05-05 DIAGNOSIS — R10.10 PAIN OF UPPER ABDOMEN: ICD-10-CM

## 2023-05-05 DIAGNOSIS — D58.0 HEREDITARY SPHEROCYTOSIS: Primary | ICD-10-CM

## 2023-05-16 ENCOUNTER — HOSPITAL ENCOUNTER (OUTPATIENT)
Dept: NUCLEAR MEDICINE | Facility: HOSPITAL | Age: 23
Discharge: HOME | End: 2023-05-16
Payer: COMMERCIAL

## 2023-05-16 DIAGNOSIS — D58.0 HEREDITARY SPHEROCYTOSIS: ICD-10-CM

## 2023-05-16 DIAGNOSIS — R10.10 PAIN OF UPPER ABDOMEN: ICD-10-CM

## 2023-05-16 PROCEDURE — 78226 HEPATOBILIARY SYSTEM IMAGING: CPT

## 2023-05-16 PROCEDURE — 0 TECHNETIUM TC 99M MEBROFENIN KIT: Performed by: INTERNAL MEDICINE

## 2023-05-16 PROCEDURE — A9537 TC99M MEBROFENIN: HCPCS | Performed by: INTERNAL MEDICINE

## 2023-05-16 RX ORDER — KIT FOR THE PREPARATION OF TECHNETIUM TC 99M MEBROFENIN 45 MG/10ML
1 INJECTION, POWDER, LYOPHILIZED, FOR SOLUTION INTRAVENOUS
Status: COMPLETED | OUTPATIENT
Start: 2023-05-16 | End: 2023-05-16

## 2023-05-16 RX ADMIN — MEBROFENIN 1 DOSE: 45 INJECTION, POWDER, LYOPHILIZED, FOR SOLUTION INTRAVENOUS at 08:19

## 2023-07-23 ENCOUNTER — APPOINTMENT (OUTPATIENT)
Dept: GENERAL RADIOLOGY | Facility: HOSPITAL | Age: 23
End: 2023-07-23
Payer: COMMERCIAL

## 2023-07-23 ENCOUNTER — HOSPITAL ENCOUNTER (EMERGENCY)
Facility: HOSPITAL | Age: 23
Discharge: HOME | End: 2023-07-23
Attending: EMERGENCY MEDICINE | Admitting: EMERGENCY MEDICINE
Payer: COMMERCIAL

## 2023-07-23 VITALS
OXYGEN SATURATION: 98 % | BODY MASS INDEX: 20.6 KG/M2 | WEIGHT: 128.2 LBS | HEIGHT: 66 IN | DIASTOLIC BLOOD PRESSURE: 63 MMHG | SYSTOLIC BLOOD PRESSURE: 117 MMHG | RESPIRATION RATE: 20 BRPM | TEMPERATURE: 98.4 F | HEART RATE: 95 BPM

## 2023-07-23 DIAGNOSIS — R74.01 ELEVATED LIVER TRANSAMINASE LEVEL: ICD-10-CM

## 2023-07-23 DIAGNOSIS — R06.02 SHORTNESS OF BREATH: Primary | ICD-10-CM

## 2023-07-23 LAB
ALBUMIN SERPL-MCNC: 5 G/DL (ref 3.5–5.2)
ALBUMIN/GLOB SERPL: 1.7 G/DL
ALP SERPL-CCNC: 54 U/L (ref 39–117)
ALT SERPL W P-5'-P-CCNC: 67 U/L (ref 1–41)
ANION GAP SERPL CALCULATED.3IONS-SCNC: 13.6 MMOL/L (ref 5–15)
AST SERPL-CCNC: 49 U/L (ref 1–40)
BASOPHILS # BLD AUTO: 0.1 10*3/MM3 (ref 0–0.2)
BASOPHILS # BLD MANUAL: 0.11 10*3/MM3 (ref 0–0.2)
BASOPHILS NFR BLD AUTO: 0.9 % (ref 0–1.5)
BASOPHILS NFR BLD MANUAL: 1 % (ref 0–1.5)
BILIRUB SERPL-MCNC: 0.9 MG/DL (ref 0–1.2)
BUN SERPL-MCNC: 11 MG/DL (ref 6–20)
BUN/CREAT SERPL: 8.5 (ref 7–25)
CALCIUM SPEC-SCNC: 9.3 MG/DL (ref 8.6–10.5)
CHLORIDE SERPL-SCNC: 101 MMOL/L (ref 98–107)
CLUMPED PLATELETS: PRESENT
CO2 SERPL-SCNC: 22.4 MMOL/L (ref 22–29)
CREAT SERPL-MCNC: 1.29 MG/DL (ref 0.76–1.27)
D DIMER PPP FEU-MCNC: 0.3 MCGFEU/ML (ref 0–0.5)
DEPRECATED RDW RBC AUTO: 34.5 FL (ref 37–54)
EGFRCR SERPLBLD CKD-EPI 2021: 80.9 ML/MIN/1.73
EOSINOPHIL # BLD AUTO: 0.11 10*3/MM3 (ref 0–0.4)
EOSINOPHIL # BLD MANUAL: 0.11 10*3/MM3 (ref 0–0.4)
EOSINOPHIL NFR BLD AUTO: 1 % (ref 0.3–6.2)
EOSINOPHIL NFR BLD MANUAL: 1 % (ref 0.3–6.2)
ERYTHROCYTE [DISTWIDTH] IN BLOOD BY AUTOMATED COUNT: 12 % (ref 12.3–15.4)
GLOBULIN UR ELPH-MCNC: 3 GM/DL
GLUCOSE SERPL-MCNC: 96 MG/DL (ref 65–99)
HCT VFR BLD AUTO: 46.7 % (ref 37.5–51)
HGB BLD-MCNC: 16.5 G/DL (ref 13–17.7)
IMM GRANULOCYTES # BLD AUTO: 0.03 10*3/MM3 (ref 0–0.05)
IMM GRANULOCYTES NFR BLD AUTO: 0.3 % (ref 0–0.5)
LARGE PLATELETS: ABNORMAL
LYMPHOCYTES # BLD AUTO: 3.92 10*3/MM3 (ref 0.7–3.1)
LYMPHOCYTES # BLD MANUAL: 3.55 10*3/MM3 (ref 0.7–3.1)
LYMPHOCYTES NFR BLD AUTO: 35.3 % (ref 19.6–45.3)
LYMPHOCYTES NFR BLD MANUAL: 3 % (ref 5–12)
MCH RBC QN AUTO: 28.6 PG (ref 26.6–33)
MCHC RBC AUTO-ENTMCNC: 35.3 G/DL (ref 31.5–35.7)
MCV RBC AUTO: 81.1 FL (ref 79–97)
MONOCYTES # BLD AUTO: 1.21 10*3/MM3 (ref 0.1–0.9)
MONOCYTES # BLD: 0.33 10*3/MM3 (ref 0.1–0.9)
MONOCYTES NFR BLD AUTO: 10.9 % (ref 5–12)
NEUTROPHILS # BLD AUTO: 6.99 10*3/MM3 (ref 1.7–7)
NEUTROPHILS NFR BLD AUTO: 5.72 10*3/MM3 (ref 1.7–7)
NEUTROPHILS NFR BLD AUTO: 51.6 % (ref 42.7–76)
NEUTROPHILS NFR BLD MANUAL: 62 % (ref 42.7–76)
NEUTS BAND NFR BLD MANUAL: 1 % (ref 0–5)
NRBC BLD AUTO-RTO: 0 /100 WBC (ref 0–0.2)
PLATELET # BLD AUTO: 360 10*3/MM3 (ref 140–450)
PMV BLD AUTO: 11.2 FL (ref 6–12)
POTASSIUM SERPL-SCNC: 3.7 MMOL/L (ref 3.5–5.2)
PROT SERPL-MCNC: 8 G/DL (ref 6–8.5)
RBC # BLD AUTO: 5.76 10*6/MM3 (ref 4.14–5.8)
RBC MORPH BLD: NORMAL
SODIUM SERPL-SCNC: 137 MMOL/L (ref 136–145)
TROPONIN T SERPL HS-MCNC: <6 NG/L
VARIANT LYMPHS NFR BLD MANUAL: 32 % (ref 19.6–45.3)
WBC MORPH BLD: NORMAL
WBC NRBC COR # BLD: 11.09 10*3/MM3 (ref 3.4–10.8)

## 2023-07-23 PROCEDURE — 93005 ELECTROCARDIOGRAM TRACING: CPT | Performed by: EMERGENCY MEDICINE

## 2023-07-23 PROCEDURE — 85025 COMPLETE CBC W/AUTO DIFF WBC: CPT | Performed by: EMERGENCY MEDICINE

## 2023-07-23 PROCEDURE — 85007 BL SMEAR W/DIFF WBC COUNT: CPT | Performed by: EMERGENCY MEDICINE

## 2023-07-23 PROCEDURE — 85379 FIBRIN DEGRADATION QUANT: CPT | Performed by: EMERGENCY MEDICINE

## 2023-07-23 PROCEDURE — 93010 ELECTROCARDIOGRAM REPORT: CPT | Performed by: INTERNAL MEDICINE

## 2023-07-23 PROCEDURE — 99283 EMERGENCY DEPT VISIT LOW MDM: CPT

## 2023-07-23 PROCEDURE — 80053 COMPREHEN METABOLIC PANEL: CPT | Performed by: EMERGENCY MEDICINE

## 2023-07-23 PROCEDURE — 84484 ASSAY OF TROPONIN QUANT: CPT | Performed by: EMERGENCY MEDICINE

## 2023-07-23 PROCEDURE — 71046 X-RAY EXAM CHEST 2 VIEWS: CPT

## 2023-07-23 RX ORDER — SODIUM CHLORIDE 0.9 % (FLUSH) 0.9 %
10 SYRINGE (ML) INJECTION AS NEEDED
Status: DISCONTINUED | OUTPATIENT
Start: 2023-07-23 | End: 2023-07-23 | Stop reason: HOSPADM

## 2023-07-23 NOTE — ED PROVIDER NOTES
Subjective   History of Present Illness  Joe Calloway Is a 23-year-old white male who presents secondary to shortness of breath.  New onset of shortness of breath this morning.  The shortness of breath has been continuous.  Patient states it feels similar to anemia he had prior to splenectomy.  Patient has a history of spherocytosis.  No other symptoms.  Patient presents for evaluation.    History provided by:  Patient    Review of Systems   Constitutional:  Negative for fever.   HENT:  Negative for rhinorrhea.    Eyes:  Negative for redness.   Respiratory:  Positive for shortness of breath. Negative for cough.    Cardiovascular:  Negative for chest pain.   Gastrointestinal:  Negative for abdominal pain.   Genitourinary:  Negative for dysuria.   Musculoskeletal:  Negative for back pain.   Skin:  Negative for rash.   Neurological:  Negative for syncope.   All other systems reviewed and are negative.    Past Medical History:   Diagnosis Date    Abdominal pain, left upper quadrant 05/10/2019    Acne 01/29/2019    Acute rhinitis 01/05/2016    Adenitis 05/14/2018    Allergic reaction 12/19/2013    Back pain 05/10/2019    Cold sore 06/24/2014    Constipation 08/04/2015    Contact dermatitis 06/07/2017    Dermatitis 06/19/2017    Dysphagia 01/29/2019    Epigastric pain 11/01/2012    Fatigue 03/19/2018    GERD (gastroesophageal reflux disease) 01/29/2019    Headache 04/10/2014    Hereditary spherocytosis 05/11/2018    Icterus 03/19/2018    Infection 06/07/2017    SKIN AND SOFT TISSUE    Ingrown nail 06/03/2014    Leg pain, left 05/10/2019    Leg pain, right 05/10/2019    LOM (left otitis media) 12/05/2011    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    Seborrheic dermatitis 05/14/2018       Allergies   Allergen Reactions    Bee Venom Swelling    Latex Hives    Tetanus-Diphth-Acell Pertussis Rash    Tetanus Immune Globulin Swelling    Pyridoxine Rash     B-6       Past Surgical History:   Procedure Laterality Date    ESOPHAGEAL  DILATATION      x2    KNEE SURGERY      SPLENECTOMY      age 18    TOE SURGERY      TONSILLECTOMY         Family History   Problem Relation Age of Onset    Gallbladder disease Mother     Lupus Father     Other Other         SPHEROCYTOSIS       Social History     Socioeconomic History    Marital status: Single   Tobacco Use    Smoking status: Never    Smokeless tobacco: Never   Vaping Use    Vaping Use: Never used   Substance and Sexual Activity    Alcohol use: Yes     Comment: social    Drug use: Never    Sexual activity: Defer           Objective   Physical Exam  Vitals and nursing note reviewed.   Constitutional:       General: He is not in acute distress.     Appearance: Normal appearance. He is well-developed. He is not ill-appearing, toxic-appearing or diaphoretic.      Comments: 23-year-old white male lying in bed.  Patient appears in good overall health.  Vital signs unremarkable.  Patient friendly and cooperative.   HENT:      Head: Normocephalic and atraumatic.      Right Ear: Tympanic membrane, ear canal and external ear normal.      Left Ear: Tympanic membrane, ear canal and external ear normal.      Nose: Nose normal.      Mouth/Throat:      Mouth: Mucous membranes are moist.      Pharynx: Oropharynx is clear.   Eyes:      Extraocular Movements: Extraocular movements intact.      Conjunctiva/sclera: Conjunctivae normal.      Pupils: Pupils are equal, round, and reactive to light.   Cardiovascular:      Rate and Rhythm: Normal rate and regular rhythm.      Heart sounds: Normal heart sounds. No murmur heard.    No friction rub. No gallop.   Pulmonary:      Effort: Pulmonary effort is normal. No respiratory distress.      Breath sounds: Normal breath sounds. No stridor. No wheezing or rales.   Abdominal:      General: There is no distension.      Palpations: Abdomen is soft. There is no mass.      Tenderness: There is no abdominal tenderness. There is no guarding or rebound.      Hernia: No hernia is  present.   Musculoskeletal:         General: Normal range of motion.      Cervical back: Normal range of motion and neck supple.   Skin:     General: Skin is warm and dry.      Findings: No erythema or rash.   Neurological:      General: No focal deficit present.      Mental Status: He is alert and oriented to person, place, and time.      Cranial Nerves: No cranial nerve deficit.      Deep Tendon Reflexes: Reflexes are normal and symmetric.   Psychiatric:         Mood and Affect: Mood normal.         Behavior: Behavior normal.       Procedures       EKG 12-lead  Date 7/23/2023  Time 18: 00  Sinus tachycardia with rate 107  Borderline right axis deviation  Normal intervals  Nonspecific repolarization abnormality  Nonspecific T wave flattening  Nonspecific EKG    ED Course  ED Course as of 07/24/23 0030   Sun Jul 23, 2023   1744 With history of spherocytosis obtaining full set of labs, troponin, D-dimer, chest x-ray and EKG. [SS]   1901 WBC(!): 11.09 [SS]   1901 Lymphocytes Absolute(!): 3.92 [SS]   1901 Monocytes Absolute(!): 1.21 [SS]   1901 Creatinine(!): 1.29 [SS]   1902 ALT (SGPT)(!): 67 [SS]   1902 AST (SGOT)(!): 49 [SS]   1930 Troponin and D-dimer unremarkable.  Chest x-ray unremarkable.  EKG borderline axis deviation with sinus tachycardia 107.  Otherwise unremarkable.  Lab abnormalities as above.  I find no evidence of cardiac or pulmonary disease process.  Recommend patient follow-up with his PCP for repeat labs secondary to mild elevation of transaminases and creatinine. [SS]   1937 Discussed at length with patient all results, diagnoses, treatment and follow-up.  Patient reports he recently drank heavily at a friend's 25rd birthday party.  This could explain mild elevation of transaminases.  We also discussed if skittles have different flavors as patient had an empty pack of skittles in his hand.  Will DC home. [SS]      ED Course User Index  [SS] Den Arroyo MD      Labs Reviewed   COMPREHENSIVE  "METABOLIC PANEL - Abnormal; Notable for the following components:       Result Value    Creatinine 1.29 (*)     ALT (SGPT) 67 (*)     AST (SGOT) 49 (*)     All other components within normal limits    Narrative:     GFR Normal >60  Chronic Kidney Disease <60  Kidney Failure <15     CBC WITH AUTO DIFFERENTIAL - Abnormal; Notable for the following components:    WBC 11.09 (*)     RDW 12.0 (*)     RDW-SD 34.5 (*)     Lymphocytes, Absolute 3.92 (*)     Monocytes, Absolute 1.21 (*)     All other components within normal limits   MANUAL DIFFERENTIAL - Abnormal; Notable for the following components:    Monocyte % 3.0 (*)     Lymphocytes Absolute 3.55 (*)     All other components within normal limits   D-DIMER, QUANTITATIVE - Normal    Narrative:     According to the assay 's published package insert, a normal (<0.50 MCGFEU/mL) D-dimer result in conjunction with a non-high clinical probability assessment, excludes deep vein thrombosis (DVT) and pulmonary embolism (PE) with high sensitivity.    D-dimer values increase with age and this can make VTE exclusion of an older population difficult. To address this, the [employer], based on best available evidence and recent guidelines, recommends that clinicians use age-adjusted D-dimer thresholds in patients greater than 50 years of age with: a) a low probability of PE who do not meet all Pulmonary Embolism Rule Out Criteria, or b) in those with intermediate probability of PE.   The formula for an age-adjusted D-dimer cut-off is \"age/100\".  For example, a 60 year old patient would have an age-adjusted cut-off of 0.60 MCGFEU/mL and an 80 year old 0.80 MCGFEU/mL.   SINGLE HSTROPONIN T - Normal    Narrative:     High Sensitive Troponin T Reference Range:  <10.0 ng/L- Negative Female for AMI  <15.0 ng/L- Negative Male for AMI  >=10 - Abnormal Female indicating possible myocardial injury.  >=15 - Abnormal Male indicating possible myocardial injury. "   Clinicians would have to utilize clinical acumen, EKG, Troponin, and serial changes to determine if it is an Acute Myocardial Infarction or myocardial injury due to an underlying chronic condition.        CBC AND DIFFERENTIAL    Narrative:     The following orders were created for panel order CBC & Differential.  Procedure                               Abnormality         Status                     ---------                               -----------         ------                     CBC Auto Differential[[id number]]        Abnormal            Final result               Scan Slide[[id number]]                                                                    Please view results for these tests on the individual orders.       XR Chest 2 View    Result Date: 7/23/2023  Narrative: XR CHEST 2 VW-, 7/23/2023  HISTORY: Shortness of air and midsternal chest pain beginning today.  COMPARISON: *  Chest 12/8/2022  FINDINGS: PA and lateral view(s) of the chest. The lungs are clear. No pleural effusions. No pneumothorax. Heart size and mediastinal contours are within normal limits. Pulmonary vasculature is unremarkable. No acute osseous abnormalities.       Impression: No acute cardiopulmonary findings.  This report was finalized on 7/23/2023 7:16 PM by Dr. Percy Garza MD.      US Gallbladder    Result Date: 7/21/2023  Narrative: PROCEDURE: US GALLBLADDER-  HISTORY: Epigastric pain. History of splenectomy.  TECHNIQUE: Grayscale and color Doppler ultrasound of the gallbladder was performed.  COMPARISON: [facility] scan 05/16/2023. CT abdomen 02/24/2005.  FINDINGS:  MEASUREMENTS: Liver:  12.4 cm Common bile duct diameter:  0.3 cm Right kidney: 9.8 cm  LIVER: The liver is normal in size and echogenicity. The echotexture is smooth. There is no intrahepatic mass or biliary ductal dilatation. There is hepatopetal flow in the main portal vein.  GALLBLADDER: The gallbladder is present without cholelithiasis, wall thickening or  pericholecystic fluid. No sonographic Humphries's sign was elicited. There is no extrahepatic biliary ductal dilatation.  PANCREAS: The pancreas is poorly seen and cannot be evaluated.  RIGHT KIDNEY: There is no hydronephrosis. No shadowing renal stone is visualized.       Impression:  No gallstones or biliary ductal dilatation.  This report was finalized on 7/21/2023 11:30 AM by Dr. Orin Ryan M.D.       My differential diagnosis for dyspnea includes but is not limited to:  Asthma, COPD, COVID-19, pneumonia, pulmonary embolus, acute respiratory distress syndrome, RSV, pneumothorax, pleural effusion, pulmonary fibrosis, congestive heart failure, myocardial infarction, DKA, uremia, acidosis, sepsis, anemia, drug related, hyperventilation, CNS disease                                         Medical Decision Making  Problems Addressed:  Elevated liver transaminase level: complicated acute illness or injury  Shortness of breath: complicated acute illness or injury    Amount and/or Complexity of Data Reviewed  Labs: ordered. Decision-making details documented in ED Course.  Radiology: ordered.  ECG/medicine tests: ordered.    Risk  Prescription drug management.        Final diagnoses:   Shortness of breath   Elevated liver transaminase level       ED Disposition  ED Disposition       ED Disposition   Discharge    Condition   Stable    Comment   --               Yarely Beckman MD  [address]  [place] [facility]  [phone]    Schedule an appointment as soon as possible for a visit in 1 week  For repeat labs are discussed.  Sooner if needed.         Medication List      No changes were made to your prescriptions during this visit.            Den Arroyo MD  07/24/23 0030

## 2023-07-23 NOTE — ED NOTES
Pt sitting up in bed, talking with ER staff. Denies any questions at this time, respirations even and unlabored, call light within reach

## 2023-07-24 LAB — QT INTERVAL: 322 MS

## 2023-07-27 ENCOUNTER — TRANSCRIBE ORDERS (OUTPATIENT)
Dept: ADMINISTRATIVE | Facility: HOSPITAL | Age: 23
End: 2023-07-27
Payer: COMMERCIAL

## 2023-07-27 DIAGNOSIS — R00.2 PALPITATIONS: Primary | ICD-10-CM

## 2023-07-28 ENCOUNTER — TELEPHONE (OUTPATIENT)
Dept: SURGERY | Facility: CLINIC | Age: 23
End: 2023-07-28
Payer: COMMERCIAL

## 2023-07-28 NOTE — TELEPHONE ENCOUNTER
Left voice mail with results.  Okay per verbal release.  Asked patient to call back and provide health update for Candice.

## 2023-07-28 NOTE — TELEPHONE ENCOUNTER
----- Message from Candice Nagy PA-C sent at 7/28/2023  9:02 AM EDT -----  Regarding: results  Hi,     Please let him know his GB US was normal. Please see how he is feeling.     Thanks,  Candice

## 2023-08-08 ENCOUNTER — HOSPITAL ENCOUNTER (OUTPATIENT)
Dept: RESPIRATORY THERAPY | Facility: HOSPITAL | Age: 23
Discharge: HOME | End: 2023-08-08
Admitting: INTERNAL MEDICINE
Payer: COMMERCIAL

## 2023-08-08 DIAGNOSIS — R00.2 PALPITATIONS: ICD-10-CM

## 2023-08-08 PROCEDURE — 93225 XTRNL ECG REC<48 HRS REC: CPT

## 2023-08-22 RX ORDER — AMITRIPTYLINE HYDROCHLORIDE 25 MG/1
TABLET, FILM COATED ORAL
Qty: 30 TABLET | Refills: 3 | OUTPATIENT
Start: 2023-08-22

## 2023-08-25 ENCOUNTER — TRANSCRIBE ORDERS (OUTPATIENT)
Dept: ADMINISTRATIVE | Facility: HOSPITAL | Age: 23
End: 2023-08-25
Payer: COMMERCIAL

## 2023-08-25 DIAGNOSIS — R06.00 DYSPNEA, UNSPECIFIED TYPE: Primary | ICD-10-CM

## 2023-10-02 ENCOUNTER — HOSPITAL ENCOUNTER (OUTPATIENT)
Dept: CARDIOLOGY | Facility: HOSPITAL | Age: 23
Discharge: HOME | End: 2023-10-02
Admitting: INTERNAL MEDICINE
Payer: COMMERCIAL

## 2023-10-02 VITALS
DIASTOLIC BLOOD PRESSURE: 80 MMHG | WEIGHT: 128 LBS | BODY MASS INDEX: 20.57 KG/M2 | SYSTOLIC BLOOD PRESSURE: 120 MMHG | HEIGHT: 66 IN

## 2023-10-02 DIAGNOSIS — R06.00 DYSPNEA, UNSPECIFIED TYPE: ICD-10-CM

## 2023-10-02 PROCEDURE — 93306 TTE W/DOPPLER COMPLETE: CPT

## 2023-10-03 LAB
BH CV ECHO MEAS - EDV(CUBED): 68.9 ML
BH CV ECHO MEAS - EDV(MOD-SP4): 75.5 ML
BH CV ECHO MEAS - EF(MOD-BP): 55 %
BH CV ECHO MEAS - EF(MOD-SP4): 53 %
BH CV ECHO MEAS - ESV(CUBED): 15.6 ML
BH CV ECHO MEAS - ESV(MOD-SP4): 35.5 ML
BH CV ECHO MEAS - FS: 39 %
BH CV ECHO MEAS - IVS/LVPW: 1 CM
BH CV ECHO MEAS - IVSD: 0.9 CM
BH CV ECHO MEAS - LA DIMENSION: 3 CM
BH CV ECHO MEAS - LAT PEAK E' VEL: 19.1 CM/SEC
BH CV ECHO MEAS - LV MASS(C)D: 114.1 GRAMS
BH CV ECHO MEAS - LVIDD: 4.1 CM
BH CV ECHO MEAS - LVIDS: 2.5 CM
BH CV ECHO MEAS - LVOT AREA: 2.8 CM2
BH CV ECHO MEAS - LVOT DIAM: 1.9 CM
BH CV ECHO MEAS - LVPWD: 0.9 CM
BH CV ECHO MEAS - MED PEAK E' VEL: 15.8 CM/SEC
BH CV ECHO MEAS - MV A MAX VEL: 46.6 CM/SEC
BH CV ECHO MEAS - MV DEC SLOPE: 427.5 CM/SEC2
BH CV ECHO MEAS - MV DEC TIME: 0.17 SEC
BH CV ECHO MEAS - MV E MAX VEL: 102 CM/SEC
BH CV ECHO MEAS - MV E/A: 2.19
BH CV ECHO MEAS - MV MAX PG: 4.4 MMHG
BH CV ECHO MEAS - MV MEAN PG: 2 MMHG
BH CV ECHO MEAS - MV P1/2T: 75.4 MSEC
BH CV ECHO MEAS - MV V2 VTI: 31.6 CM
BH CV ECHO MEAS - MVA(P1/2T): 2.9 CM2
BH CV ECHO MEAS - PA V2 MAX: 93.9 CM/SEC
BH CV ECHO MEAS - RV MAX PG: 2.8 MMHG
BH CV ECHO MEAS - RV V1 MAX: 83 CM/SEC
BH CV ECHO MEAS - RV V1 VTI: 19.2 CM
BH CV ECHO MEAS - RVDD: 2.3 CM
BH CV ECHO MEAS - SV(MOD-SP4): 40 ML
BH CV ECHO MEAS - TAPSE (>1.6): 2.29 CM
BH CV ECHO MEAS - TR MAX PG: 10.8 MMHG
BH CV ECHO MEAS - TR MAX VEL: 164 CM/SEC
BH CV ECHO MEASUREMENTS AVERAGE E/E' RATIO: 5.85
BH CV XLRA - TDI S': 15.2 CM/SEC
SINUS: 2.7 CM

## 2023-10-17 ENCOUNTER — OFFICE VISIT (OUTPATIENT)
Dept: CARDIOLOGY | Facility: CLINIC | Age: 23
End: 2023-10-17
Payer: COMMERCIAL

## 2023-10-17 VITALS
OXYGEN SATURATION: 98 % | SYSTOLIC BLOOD PRESSURE: 124 MMHG | HEIGHT: 66 IN | BODY MASS INDEX: 20.57 KG/M2 | HEART RATE: 64 BPM | WEIGHT: 128 LBS | DIASTOLIC BLOOD PRESSURE: 72 MMHG

## 2023-10-17 DIAGNOSIS — R00.2 PALPITATIONS: Primary | ICD-10-CM

## 2023-10-17 DIAGNOSIS — M35.00 SJOGREN'S SYNDROME, WITH UNSPECIFIED ORGAN INVOLVEMENT: ICD-10-CM

## 2023-10-17 NOTE — PROGRESS NOTES
"    Subjective:     Encounter Date:10/17/2023      Patient ID: Joe Calloway is a 23 y.o. male.    Chief Complaint:  History of Present Illness 23-year-old white male with a recent diagnosis of digoxin syndrome and fibromyalgia presents to my office for consultation.  Patient has been having symptoms of palpitations of increased severity along with some dizziness.  He has been having palpitations for at least 5 to 10 minutes every time he has and he has it for at least once in 2 days.  No complaints of any chest pain or shortness of breath.  No swelling of the feet.  He has not been on medicines.  /72   Pulse 64   Ht 167.6 cm (66\")   Wt 58.1 kg (128 lb)   SpO2 98%   BMI 20.66 kg/m²     The following portions of the patient's history were reviewed and updated as appropriate: allergies, current medications, past family history, past medical history, past social history, past surgical history, and problem list.  Past Medical History:   Diagnosis Date    Abdominal pain, left upper quadrant 05/10/2019    Acne 01/29/2019    Acute rhinitis 01/05/2016    Adenitis 05/14/2018    Allergic reaction 12/19/2013    Back pain 05/10/2019    Cold sore 06/24/2014    Constipation 08/04/2015    Contact dermatitis 06/07/2017    Dermatitis 06/19/2017    Dysphagia 01/29/2019    Epigastric pain 11/01/2012    Fatigue 03/19/2018    GERD (gastroesophageal reflux disease) 01/29/2019    Headache 04/10/2014    Hereditary spherocytosis 05/11/2018    Icterus 03/19/2018    Infection 06/07/2017    SKIN AND SOFT TISSUE    Ingrown nail 06/03/2014    Leg pain, left 05/10/2019    Leg pain, right 05/10/2019    LOM (left otitis media) 12/05/2011    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    Seborrheic dermatitis 05/14/2018    Sjogren's syndrome      Past Surgical History:   Procedure Laterality Date    ESOPHAGEAL DILATATION      x2    KNEE SURGERY      SPLENECTOMY      age 18    TOE SURGERY      TONSILLECTOMY       Social History     Socioeconomic " History    Marital status: Single   Tobacco Use    Smoking status: Never     Passive exposure: Never    Smokeless tobacco: Never   Vaping Use    Vaping Use: Never used   Substance and Sexual Activity    Alcohol use: Yes     Comment: social    Drug use: Never    Sexual activity: Defer     Family History   Problem Relation Age of Onset    Hypertension Mother     Gallbladder disease Mother     Lupus Father     Other Other         SPHEROCYTOSIS       Current Outpatient Medications:     amitriptyline (ELAVIL) 25 MG tablet, Take 1 tablet by mouth Every Night., Disp: 30 tablet, Rfl: 3  Allergies   Allergen Reactions    Bee Venom Swelling    Latex Hives    Tetanus-Diphth-Acell Pertussis Rash    Tetanus Immune Globulin Swelling    Pyridoxine Rash     B-6       Review of Systems   Constitutional: Positive for malaise/fatigue. Negative for fever.   HENT:  Negative for ear pain and nosebleeds.    Eyes:  Negative for blurred vision and double vision.   Cardiovascular:  Positive for palpitations. Negative for chest pain, dyspnea on exertion and leg swelling.   Respiratory:  Negative for cough and shortness of breath.    Skin:  Negative for rash.   Musculoskeletal:  Negative for joint pain.   Gastrointestinal:  Positive for nausea. Negative for abdominal pain and vomiting.   Neurological:  Positive for dizziness, light-headedness and numbness. Negative for focal weakness and headaches.   Psychiatric/Behavioral:  Negative for depression. The patient is not nervous/anxious.    All other systems reviewed and are negative.             Objective:     Constitutional:       Appearance: Well-developed.   Eyes:      General: No scleral icterus.     Conjunctiva/sclera: Conjunctivae normal.   HENT:      Head: Normocephalic and atraumatic.   Neck:      Vascular: No carotid bruit or JVD.   Pulmonary:      Effort: Pulmonary effort is normal.      Breath sounds: Normal breath sounds. No wheezing. No rales.   Cardiovascular:      Normal rate.  Regular rhythm.   Pulses:     Intact distal pulses.   Abdominal:      General: Bowel sounds are normal.      Palpations: Abdomen is soft.   Musculoskeletal:      Cervical back: Normal range of motion and neck supple. Skin:     General: Skin is warm and dry.      Findings: No rash.   Neurological:      Mental Status: Alert.         Procedures    Lab Review:       Assessment:          Diagnosis Plan   1. Palpitations        2. Sjogren's syndrome, with unspecified organ involvement               Plan:       Patient presented with palpitations and had an echocardiogram which showed mild thickening of the aortic valve but he has a diagnosis of digoxin syndrome and hence he needs to be monitored only and no other testing at this time  Patient has been having palpitations but had a Holter monitor during which he did not have much arrhythmias but was only for 2 days and hence I will perform the event monitor for 15 days  Patient should have a TSH level also checked.

## 2023-10-23 ENCOUNTER — PATIENT ROUNDING (BHMG ONLY) (OUTPATIENT)
Dept: CARDIOLOGY | Facility: CLINIC | Age: 23
End: 2023-10-23
Payer: COMMERCIAL

## 2024-10-17 ENCOUNTER — OFFICE VISIT (OUTPATIENT)
Dept: CARDIOLOGY | Facility: CLINIC | Age: 24
End: 2024-10-17
Payer: COMMERCIAL

## 2024-10-17 VITALS
SYSTOLIC BLOOD PRESSURE: 111 MMHG | OXYGEN SATURATION: 100 % | DIASTOLIC BLOOD PRESSURE: 69 MMHG | HEART RATE: 65 BPM | HEIGHT: 66 IN | WEIGHT: 130 LBS | BODY MASS INDEX: 20.89 KG/M2

## 2024-10-17 DIAGNOSIS — M35.00 SJOGREN'S SYNDROME, WITH UNSPECIFIED ORGAN INVOLVEMENT: ICD-10-CM

## 2024-10-17 DIAGNOSIS — R00.2 PALPITATIONS: Primary | ICD-10-CM

## 2024-10-17 RX ORDER — METOPROLOL SUCCINATE 25 MG/1
25 TABLET, EXTENDED RELEASE ORAL
COMMUNITY
Start: 2024-09-13

## 2024-10-17 NOTE — PROGRESS NOTES
Subjective:     Encounter Date:10/17/2024      Patient ID: Joe Calloway is a 24 y.o. male.    Chief Complaint:  History of Present Illness 24-year-old white male with history of palpitations and sjogren's  syndrome presents to my office for follow-up.  Patient is currently stable without any symptoms of chest pain or shortness of breath at rest or exertion.  No grandmas any PND orthopnea.  Patient has occasional palpitation without any dizziness syncope or swelling of the feet.  Patient is taking his medicines regularly does not smoke.    The following portions of the patient's history were reviewed and updated as appropriate: allergies, current medications, past family history, past medical history, past social history, past surgical history, and problem list.  Past Medical History:   Diagnosis Date    Abdominal pain, left upper quadrant 05/10/2019    Acne 01/29/2019    Acute rhinitis 01/05/2016    Adenitis 05/14/2018    Allergic reaction 12/19/2013    Back pain 05/10/2019    Cold sore 06/24/2014    Constipation 08/04/2015    Contact dermatitis 06/07/2017    Dermatitis 06/19/2017    Dysphagia 01/29/2019    Epigastric pain 11/01/2012    Fatigue 03/19/2018    GERD (gastroesophageal reflux disease) 01/29/2019    Headache 04/10/2014    Hereditary spherocytosis 05/11/2018    Icterus 03/19/2018    Infection 06/07/2017    SKIN AND SOFT TISSUE    Ingrown nail 06/03/2014    Leg pain, left 05/10/2019    Leg pain, right 05/10/2019    LOM (left otitis media) 12/05/2011    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  Family History   Problem Relation Age of Onset    Hypertension Mother     Gallbladder disease Mother     Lupus Father     Other Other          SPHEROCYTOSIS       Current Outpatient Medications:     amitriptyline (ELAVIL) 25 MG tablet, Take 1 tablet by mouth Every Night., Disp: 30 tablet, Rfl: 3  Allergies   Allergen Reactions    Bee Venom Swelling    Latex Hives    Tetanus-Diphth-Acell Pertussis Rash    Tetanus Immune Globulin Swelling    Pyridoxine Rash     B-6     Social History     Socioeconomic History    Marital status: Single   Tobacco Use    Smoking status: Never     Passive exposure: Never    Smokeless tobacco: Never   Vaping Use    Vaping status: Never Used   Substance and Sexual Activity    Alcohol use: Yes     Comment: social    Drug use: Never    Sexual activity: Defer     Review of Systems   Constitutional: Negative for malaise/fatigue.   Cardiovascular:  Positive for palpitations. Negative for chest pain, dyspnea on exertion and leg swelling.   Respiratory:  Negative for cough and shortness of breath.    Gastrointestinal:  Negative for abdominal pain, nausea and vomiting.   Neurological:  Negative for dizziness, focal weakness, headaches, light-headedness and numbness.   All other systems reviewed and are negative.           Objective:     Constitutional:       Appearance: Well-developed.   Eyes:      General: No scleral icterus.     Conjunctiva/sclera: Conjunctivae normal.   HENT:      Head: Normocephalic and atraumatic.   Neck:      Vascular: No carotid bruit or JVD.   Pulmonary:      Effort: Pulmonary effort is normal.      Breath sounds: Normal breath sounds. No wheezing. No rales.   Cardiovascular:      Normal rate. Regular rhythm.   Pulses:     Intact distal pulses.   Abdominal:      General: Bowel sounds are normal.      Palpations: Abdomen is soft.   Musculoskeletal:      Cervical back: Normal range of motion and neck supple. Skin:     General: Skin is warm and dry.      Findings: No rash.   Neurological:      Mental Status: Alert.       Procedures    Lab Review:         MDM    #1 history of palpitations  Patient is currently stable  with only occasional palpitations and is on metoprolol  2.  History of sjogren's syndrome  Patient is currently followed by primary care doctor and had an echocardiogram which showed abnormal thickening of the aortic valve but does not have any significant stenosis and hence I will follow him closely    Patient's previous medical records, labs, and EKG were reviewed and discussed with the patient at today's visit.

## 2025-08-11 ENCOUNTER — HOSPITAL ENCOUNTER (OUTPATIENT)
Dept: CT IMAGING | Facility: HOSPITAL | Age: 25
Discharge: HOME | End: 2025-08-11
Admitting: INTERNAL MEDICINE
Payer: COMMERCIAL

## 2025-08-11 DIAGNOSIS — R10.12 LEFT UPPER QUADRANT ABDOMINAL PAIN: ICD-10-CM

## 2025-08-11 PROCEDURE — 74177 CT ABD & PELVIS W/CONTRAST: CPT

## 2025-08-11 PROCEDURE — 25510000001 IOPAMIDOL PER 1 ML: Performed by: INTERNAL MEDICINE

## 2025-08-11 RX ORDER — IOPAMIDOL 755 MG/ML
100 INJECTION, SOLUTION INTRAVASCULAR
Status: COMPLETED | OUTPATIENT
Start: 2025-08-11 | End: 2025-08-11

## 2025-08-11 RX ADMIN — IOPAMIDOL 100 ML: 755 INJECTION, SOLUTION INTRAVENOUS at 09:49
